# Patient Record
Sex: FEMALE | Race: BLACK OR AFRICAN AMERICAN | Employment: FULL TIME | URBAN - METROPOLITAN AREA
[De-identification: names, ages, dates, MRNs, and addresses within clinical notes are randomized per-mention and may not be internally consistent; named-entity substitution may affect disease eponyms.]

---

## 2017-03-17 ENCOUNTER — OFFICE VISIT (OUTPATIENT)
Dept: INTERNAL MEDICINE CLINIC | Age: 42
End: 2017-03-17

## 2017-03-17 VITALS
HEART RATE: 93 BPM | SYSTOLIC BLOOD PRESSURE: 124 MMHG | HEIGHT: 66 IN | BODY MASS INDEX: 29.73 KG/M2 | DIASTOLIC BLOOD PRESSURE: 78 MMHG | WEIGHT: 185 LBS | TEMPERATURE: 98.1 F | RESPIRATION RATE: 16 BRPM | OXYGEN SATURATION: 99 %

## 2017-03-17 DIAGNOSIS — N39.0 FREQUENT UTI: Primary | ICD-10-CM

## 2017-03-17 DIAGNOSIS — F41.8 SITUATIONAL ANXIETY: ICD-10-CM

## 2017-03-17 DIAGNOSIS — J30.89 NON-SEASONAL ALLERGIC RHINITIS DUE TO OTHER ALLERGIC TRIGGER: ICD-10-CM

## 2017-03-17 DIAGNOSIS — Z00.00 ROUTINE GENERAL MEDICAL EXAMINATION AT A HEALTH CARE FACILITY: ICD-10-CM

## 2017-03-17 DIAGNOSIS — H66.91 RECURRENT OTITIS MEDIA, RIGHT: ICD-10-CM

## 2017-03-17 DIAGNOSIS — Z98.84 S/P LAPAROSCOPIC SLEEVE GASTRECTOMY: ICD-10-CM

## 2017-03-17 LAB
BILIRUB UR QL STRIP: NEGATIVE
GLUCOSE UR-MCNC: NEGATIVE MG/DL
KETONES P FAST UR STRIP-MCNC: NEGATIVE MG/DL
PH UR STRIP: 6 [PH] (ref 4.6–8)
PROT UR QL STRIP: NORMAL MG/DL
SP GR UR STRIP: 1.03 (ref 1–1.03)
UA UROBILINOGEN AMB POC: NORMAL (ref 0.2–1)
URINALYSIS CLARITY POC: CLEAR
URINALYSIS COLOR POC: YELLOW
URINE BLOOD POC: NORMAL
URINE LEUKOCYTES POC: NEGATIVE
URINE NITRITES POC: NEGATIVE

## 2017-03-17 RX ORDER — CIPROFLOXACIN 250 MG/1
250 TABLET, FILM COATED ORAL 2 TIMES DAILY
Qty: 6 TAB | Refills: 0 | Status: SHIPPED | OUTPATIENT
Start: 2017-03-17 | End: 2017-03-20

## 2017-03-17 RX ORDER — PROPRANOLOL HYDROCHLORIDE 10 MG/1
10 TABLET ORAL
Qty: 30 TAB | Refills: 1 | Status: SHIPPED | OUTPATIENT
Start: 2017-03-17 | End: 2018-01-08

## 2017-03-17 RX ORDER — CETIRIZINE HYDROCHLORIDE 10 MG/1
10 TABLET, CHEWABLE ORAL DAILY
Qty: 30 TAB | Refills: 3 | Status: SHIPPED | OUTPATIENT
Start: 2017-03-17 | End: 2018-01-08

## 2017-03-17 RX ORDER — FLUTICASONE PROPIONATE 50 MCG
2 SPRAY, SUSPENSION (ML) NASAL DAILY
Qty: 1 BOTTLE | Refills: 3 | Status: SHIPPED | OUTPATIENT
Start: 2017-03-17

## 2017-03-17 NOTE — PROGRESS NOTES
HISTORY OF PRESENT ILLNESS  Lori Tavarez is a 39 y.o. female. Bladder Infection    The current episode started more than 1 week ago. The problem has been gradually worsening. The patient is experiencing no pain. There has been no fever. Associated symptoms include frequency (started 1 week ). Pertinent negatives include no chills, no hematuria, no hesitancy and no urgency. Her past medical history is significant for recurrent UTIs (followed by Dr. Caro Cole ). Cold Symptoms   The current episode started more than 1 week ago (3 weeks ). The cough is non-productive. Associated symptoms include rhinorrhea (clear ). Pertinent negatives include no chest pain, no chills, no ear congestion, no ear pain (repeat ear infections- seen at pt first ), no sore throat, no myalgias and no shortness of breath. Treatments tried: Zyrtec      Test Anxiety  She has a big administrative test coming- mid April. She gets anxiety and Tremors. Obesity  Body mass index is 29.86 kg/(m^2). Has been   SHx: career teach teacher for 11 years. Review of Systems   Constitutional: Positive for weight gain. Negative for chills. HENT: Positive for rhinorrhea (clear ). Negative for ear pain (repeat ear infections- seen at pt first ) and sore throat. Respiratory: Negative for shortness of breath. Cardiovascular: Negative for chest pain. Genitourinary: Positive for frequency (started 1 week ). Negative for hematuria, hesitancy and urgency. Musculoskeletal: Negative for myalgias. 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  30 Tab 3    ciprofloxacin HCl (CIPRO) 250 mg tablet Take 1 Tab by mouth two (2) times a day for 3 days. 6 Tab 0    fluticasone (FLONASE) 50 mcg/actuation nasal spray 2 Sprays by Both Nostrils route daily. 1 Bottle 3    propranolol (INDERAL) 10 mg tablet Take 1 Tab by mouth three (3) times daily as needed. 30mins-1hr before stress situation 30 Tab 1    ethinyl estradiol-etonogestrel (NUVARING) 0.12-0.015 mg/24 hr vaginal ring by Intravaginally route.  naltrexone-buPROPion (CONTRAVE) 8-90 mg TbER ER tablet First Month: Contrave 8mg/90mg #70    Week 1 : 1 Tab AM  Week 2 : 1 Tab AM, 1 Tab PM  Week 3 : 2 Tab AM, 1 Tab PM  Week 4 : 2 Tab AM, 2 Tab PM    Week 5 and Beyond: Contrave 8mg/90mg #120   2 Tab AM, 2 Tab PM 70 Tab 0    ergocalciferol (ERGOCALCIFEROL) 50,000 unit capsule Take 1 Cap by mouth every seven (7) days. 8 Cap 0    pantoprazole (PROTONIX) 40 mg tablet       famotidine (PEPCID) 20 mg tablet          No Known Allergies   Visit Vitals    /78 (BP 1 Location: Right arm, BP Patient Position: Sitting)    Pulse 93    Temp 98.1 °F (36.7 °C) (Oral)    Resp 16    Ht 5' 6\" (1.676 m)    Wt 185 lb (83.9 kg)    SpO2 99%    BMI 29.86 kg/m2       Physical Exam   Constitutional: She is oriented to person, place, and time. She appears well-developed. No distress. HENT:   Right Ear: Tympanic membrane is retracted. Left Ear: Tympanic membrane is retracted. Nose: Mucosal edema, rhinorrhea and septal deviation (slight to left ) present. Right sinus exhibits no maxillary sinus tenderness and no frontal sinus tenderness. Left sinus exhibits no maxillary sinus tenderness and no frontal sinus tenderness. Mouth/Throat: No posterior oropharyngeal edema or posterior oropharyngeal erythema. Eyes: Conjunctivae are normal.   Cardiovascular: Normal rate, regular rhythm and normal heart sounds. Pulmonary/Chest: Effort normal and breath sounds normal. No respiratory distress. She has no wheezes. She has no rales. She exhibits no tenderness. Lymphadenopathy:     She has cervical adenopathy. Neurological: She is alert and oriented to person, place, and time. Skin: Skin is warm. Psychiatric: She has a normal mood and affect. Recent Results (from the past 12 hour(s))   AMB POC URINALYSIS DIP STICK AUTO W/O MICRO    Collection Time: 03/17/17  1:40 PM   Result Value Ref Range    Color (UA POC) Yellow     Clarity (UA POC) Clear     Glucose (UA POC) Negative Negative    Bilirubin (UA POC) Negative Negative    Ketones (UA POC) Negative Negative    Specific gravity (UA POC) 1.030 1.001 - 1.035    Blood (UA POC) 2+ Negative    pH (UA POC) 6.0 4.6 - 8.0    Protein (UA POC) Trace Negative mg/dL    Urobilinogen (UA POC) 0.2 mg/dL 0.2 - 1    Nitrites (UA POC) Negative Negative    Leukocyte esterase (UA POC) Negative Negative       ASSESSMENT and PLAN  Mike was seen today for weight management. Diagnoses and all orders for this visit:    Frequent UTI- Udip negative but given her history will start empiric treatment will awaiting her lab UA/ cx   Red flags to warrant ER or earlier clinical evaluation reviewed. See AVS for full details of plan and patient discussion.     -     ciprofloxacin HCl (CIPRO) 250 mg tablet; Take 1 Tab by mouth two (2) times a day for 3 days.  -     AMB POC URINALYSIS DIP STICK AUTO W/O MICRO  -     UA/M W/RFLX CULTURE, COMP    Non-seasonal allergic rhinitis due to other allergic trigger- Allergic Rhinitis   -Use Flonase 1-2 sprays per nostril once a day  -Use nasal saline spray 3-4 times/day BEFORE Flonase  -Start taking a non sedating antihistamine such as Claritin, Allegra or Zyrtec (generic is fine)    -     cetirizine (ALL DAY ALLERGY) 10 mg chewable tablet; Take 1 Tab by mouth daily. -     fluticasone (FLONASE) 50 mcg/actuation nasal spray; 2 Sprays by Both Nostrils route daily.     Recurrent otitis media, right  -     REFERRAL TO ENT-OTOLARYNGOLOGY    Routine general medical examination at a health care facility  - LIPID PANEL  -     HEMOGLOBIN A1C WITH EAG    S/P laparoscopic sleeve gastrectomy  -     VITAMIN B12  -     CBC WITH AUTOMATED DIFF  -     IRON PROFILE  -     VITAMIN D, 25 HYDROXY    Situational anxiety- trial e  -     propranolol (INDERAL) 10 mg tablet; Take 1 Tab by mouth three (3) times daily as needed. 30mins-1hr before stress situation      Follow-up Disposition:  Return in about 5 months (around 8/17/2017) for Physical - 30 minute appointment. Medication risks/benefits/costs/interactions/alternatives discussed with patient. Leopoldo Hammersmith  was given an after visit summary which includes diagnoses, current medications, & vitals. she expressed understanding with the diagnosis and plan.

## 2017-03-17 NOTE — MR AVS SNAPSHOT
Visit Information Date & Time Provider Department Dept. Phone Encounter #  
 3/17/2017  1:15 PM Rose Coronel MD [address] [address] Internal Medicine [phone] Follow-up Instructions Return in about 5 months (around 8/17/2017) for Physical - 30 minute appointment. Upcoming Health Maintenance Date Due DTaP/Tdap/Td series (1 - Tdap) 10/28/1996 INFLUENZA AGE 9 TO ADULT 8/1/2016 PAP AKA CERVICAL CYTOLOGY 3/11/2018 Allergies as of 3/17/2017  Review Complete On: 3/17/2017 By: Roes Coronel MD  
 No Known Allergies Current Immunizations  Never Reviewed No immunizations on file. Not reviewed this visit You Were Diagnosed With   
  
 Codes Comments Frequent UTI    -  Primary ICD-10-CM: N39.0 ICD-9-CM: 599.0 Non-seasonal allergic rhinitis due to other allergic trigger     ICD-10-CM: J30.89 Recurrent otitis media, right     ICD-10-CM: H66.91 
ICD-9-CM: 382.9 Routine general medical examination at a health care facility     ICD-10-CM: Z00.00 ICD-9-CM: V70.0 S/P laparoscopic sleeve gastrectomy     ICD-10-CM: X89.65 ICD-9-CM: V45.86 Situational anxiety     ICD-10-CM: F41.8 ICD-9-CM: 300.09 Vitals BP Pulse Temp Resp Height(growth percentile) Weight(growth percentile) 124/78 (BP 1 Location: Right arm, BP Patient Position: Sitting) 93 98.1 °F (36.7 °C) (Oral) 16 5' 6\" (1.676 m) 185 lb (83.9 kg) LMP SpO2 BMI OB Status Smoking Status 02/22/2017 99% 29.86 kg/m2 Having regular periods Never Smoker Vitals History BMI and BSA Data Body Mass Index Body Surface Area  
 29.86 kg/m 2 1.98 m 2 Preferred Pharmacy Pharmacy Name Phone [facility] [address] [facility] [place] [phone] Your Updated Medication List  
  
   
This list is accurate as of: 3/17/17  2:06 PM.  Always use your most recent med list.  
  
  
  
  
 cetirizine 10 mg chewable tablet Commonly known as: All Day Allergy Take 1 Tab by mouth daily. ciprofloxacin HCl 250 mg tablet Commonly known as:  CIPRO Take 1 Tab by mouth two (2) times a day for 3 days. ergocalciferol 50,000 unit capsule Commonly known as:  ERGOCALCIFEROL Take 1 Cap by mouth every seven (7) days. ethinyl estradiol-etonogestrel 0.12-0.015 mg/24 hr vaginal ring Commonly known as:  NUVARING  
by Intravaginally route. famotidine 20 mg tablet Commonly known as:  PEPCID  
  
 fluticasone 50 mcg/actuation nasal spray Commonly known as:  Arlen Jumper 2 Sprays by Both Nostrils route daily. naltrexone-buPROPion 8-90 mg Tber ER tablet Commonly known as:  Jolene Amos First Month: Contrave 8mg/90mg #70  Week 1 : 1 Tab AM Week 2 : 1 Tab AM, 1 Tab PM Week 3 : 2 Tab AM, 1 Tab PM Week 4 : 2 Tab AM, 2 Tab PM  Week 5 and Beyond: Contrave 8mg/90mg #120  2 Tab AM, 2 Tab PM  
  
 pantoprazole 40 mg tablet Commonly known as:  PROTONIX  
  
 propranolol 10 mg tablet Commonly known as:  INDERAL Take 1 Tab by mouth three (3) times daily as needed. 30mins-1hr before stress situation Prescriptions Sent to Pharmacy Refills  
 cetirizine (ALL DAY ALLERGY) 10 mg chewable tablet 3 Sig: Take 1 Tab by mouth daily. Class: Normal  
 Pharmacy: [address] [place] [address] Ph #: [phone] Route: Oral  
 ciprofloxacin HCl (CIPRO) 250 mg tablet 0 Sig: Take 1 Tab by mouth two (2) times a day for 3 days. Class: Normal  
 Pharmacy: [address] [place] [address] Ph #: [phone] Route: Oral  
 fluticasone (FLONASE) 50 mcg/actuation nasal spray 3 Si Sprays by Both Nostrils route daily. Class: Normal  
 Pharmacy: [address] [place] [address] Ph #: [phone] Route:  Both Nostrils  
 propranolol (INDERAL) 10 mg tablet 1  
 Sig: Take 1 Tab by mouth three (3) times daily as needed. 30mins-1hr before stress situation Class: Normal  
 Pharmacy: [facility], [address] [place] [address] #: [phone] Route: Oral  
  
We Performed the Following AMB POC URINALYSIS DIP STICK AUTO W/O MICRO [20326 CPT(R)] CBC WITH AUTOMATED DIFF [16774 CPT(R)] HEMOGLOBIN A1C WITH EAG [99902 CPT(R)] IRON PROFILE [id number] CPT(R)] LIPID PANEL [74205 CPT(R)] REFERRAL TO ENT-OTOLARYNGOLOGY [MKY89 Custom] Comments:  
 Please evaluate patient for  Recurrent ear infection UA/M W/RFLX CULTURE, COMP [39466 CPT(R)] VITAMIN B12 W262109 CPT(R)] VITAMIN D, 25 HYDROXY [id number] CPT(R)] Follow-up Instructions Return in about 5 months (around 8/17/2017) for Physical - 30 minute appointment. Referral Information Referral ID Referred By Referred To  
  
 [id number] PAUL[facility], [place] Eliseo Goodwin MD   
   [address] [place] [address] Phone: [phone] Fax: [phone] Visits Status Start Date End Date 1 New Request 3/17/17 3/17/18 If your referral has a status of pending review or denied, additional information will be sent to support the outcome of this decision. Patient Instructions It was a pleasure to see you! As discussed: 
 
Congratulations on your weight loss and positive lifestyle changes!!! 
 
I have ordered your age appropriate labs please complete them. You will need to fast 10-12hrs before your lab appointment. Complete labs two weeks before your next appointment. Allergic Rhinitis  
-Use Flonase 1-2 sprays per nostril once a day 
-Use nasal saline spray 3-4 times/day BEFORE Flonase 
-Start taking a non sedating antihistamine such as Claritin, Allegra or Zyrtec (generic is fine) You are having UTI symptoms Take the antibiotics as prescribed. I've sent  A culture of the urine to find out what bacteria is causing your symptoms and if the current antibiotics will be effective. If we need to change your medication,our office will call you. Managing Your Allergies: Care Instructions Your Care Instructions Managing your allergies is an important part of staying healthy. Your doctor will help you find out what may be causing the allergies. Common causes of allergy symptoms are house dust and dust mites, animal dander, mold, and pollen. As soon as you know what triggers your symptoms, try to reduce your exposure to your triggers. This can help prevent allergy symptoms, asthma, and other health problems. Ask your doctor about allergy medicine or immunotherapy. These treatments may help reduce or prevent allergy symptoms. Follow-up care is a key part of your treatment and safety. Be sure to make and go to all appointments, and call your doctor if you are having problems. It's also a good idea to know your test results and keep a list of the medicines you take. How can you care for yourself at home? · If you think that dust or dust mites are causing your allergies: 
¨ Wash sheets, pillowcases, and other bedding every week in hot water. ¨ Use airtight, dust-proof covers for pillows, duvets, and mattresses. Avoid plastic covers, because they tend to tear quickly and do not \"breathe. \" Wash according to the instructions. ¨ Remove extra blankets and pillows that you don't need. ¨ Use blankets that are machine-washable. ¨ Don't use home humidifiers. They can help mites live longer. · Use air-conditioning. Change or clean all filters every month. Keep windows closed. Use high-efficiency air filters. Don't use window or attic fans, which draw dust into the air. · If you're allergic to pet dander, keep pets outside or, at the very least, out of your bedroom.  Old carpet and cloth-covered furniture can hold a lot of animal dander. You may need to replace them. · Look for signs of cockroaches. Use cockroach baits to get rid of them. Then clean your home well. · If you're allergic to mold, don't keep indoor plants, because molds can grow in soil. Get rid of furniture, rugs, and drapes that smell musty. Check for mold in the bathroom. · If you're allergic to pollen, stay inside when pollen counts are high. · Don't smoke or let anyone else smoke in your house. Don't use fireplaces or wood-burning stoves. Avoid paint fumes, perfumes, and other strong odors. When should you call for help? Give an epinephrine shot if: 
· You think you are having a severe allergic reaction. · You have symptoms in more than one body area, such as mild nausea and an itchy mouth. After giving an epinephrine shot call 911, even if you feel better. Call 911 if: 
· You have symptoms of a severe allergic reaction. These may include: 
¨ Sudden raised, red areas (hives) all over your body. ¨ Swelling of the throat, mouth, lips, or tongue. ¨ Trouble breathing. ¨ Passing out (losing consciousness). Or you may feel very lightheaded or suddenly feel weak, confused, or restless. · You have been given an epinephrine shot, even if you feel better. Call your doctor now or seek immediate medical care if: 
· You have symptoms of an allergic reaction, such as: ¨ A rash or hives (raised, red areas on the skin). ¨ Itching. ¨ Swelling. ¨ Belly pain, nausea, or vomiting. Watch closely for changes in your health, and be sure to contact your doctor if: 
· Your allergies get worse. · You need help controlling your allergies. · You have questions about allergy testing. · You do not get better as expected. Where can you learn more? Go to http://sumit-velia.info/. Enter L249 in the search box to learn more about \"Managing Your Allergies: Care Instructions. \" Current as of: February 12, 2016 Content Version: 11.1 © 7484-5097 Healthwise, Incorporated. Care instructions adapted under license by [employer] (which disclaims liability or warranty for this information). If you have questions about a medical condition or this instruction, always ask your healthcare professional. [address] any warranty or liability for your use of this information. Introducing [facility]! Jessica Delgado introduces goBramble patient portal. Now you can access parts of your medical record, email your doctor's office, and request medication refills online. 1. In your internet browser, go to [email or web address] 2. Click on the First Time User? Click Here link in the Sign In box. You will see the New Member Sign Up page. 3. Enter your goBramble Access Code exactly as it appears below. You will not need to use this code after youve completed the sign-up process. If you do not sign up before the expiration date, you must request a new code. · goBramble Access Code: [email or web address] Expires: 6/15/2017  2:04 PM 
 
4. Enter the last four digits of your Social Security Number (xxxx) and Date of Birth (mm/dd/yyyy) as indicated and click Submit. You will be taken to the next sign-up page. 5. Create a goBramble ID. This will be your goBramble login ID and cannot be changed, so think of one that is secure and easy to remember. 6. Create a goBramble password. You can change your password at any time. 7. Enter your Password Reset Question and Answer. This can be used at a later time if you forget your password. 8. Enter your e-mail address. You will receive e-mail notification when new information is available in [address] 9. Click Sign Up. You can now view and download portions of your medical record. 10. Click the Download Summary menu link to download a portable copy of your medical information.  
 
If you have questions, please visit the Frequently Asked Questions section of the [email or web address]. Remember, OutSystemshart is NOT to be used for urgent needs. For medical emergencies, dial 911. Now available from your iPhone and Android! Please provide this summary of care documentation to your next provider. Your primary care clinician is listed as Merry Alert. If you have any questions after today's visit, please call [phone].

## 2017-03-17 NOTE — PATIENT INSTRUCTIONS
It was a pleasure to see you! As discussed:    Congratulations on your weight loss and positive lifestyle changes!!!    I have ordered your age appropriate labs please complete them. You will need to fast 10-12hrs before your lab appointment. Complete labs two weeks before your next appointment. Allergic Rhinitis   -Use Flonase 1-2 sprays per nostril once a day  -Use nasal saline spray 3-4 times/day BEFORE Flonase  -Start taking a non sedating antihistamine such as Claritin, Allegra or Zyrtec (generic is fine)    You are having UTI symptoms   Take the antibiotics as prescribed. I've sent  A culture of the urine to find out what bacteria is causing your symptoms and if the current antibiotics will be effective. If we need to change your medication,our office will call you. Managing Your Allergies: Care Instructions  Your Care Instructions  Managing your allergies is an important part of staying healthy. Your doctor will help you find out what may be causing the allergies. Common causes of allergy symptoms are house dust and dust mites, animal dander, mold, and pollen. As soon as you know what triggers your symptoms, try to reduce your exposure to your triggers. This can help prevent allergy symptoms, asthma, and other health problems. Ask your doctor about allergy medicine or immunotherapy. These treatments may help reduce or prevent allergy symptoms. Follow-up care is a key part of your treatment and safety. Be sure to make and go to all appointments, and call your doctor if you are having problems. It's also a good idea to know your test results and keep a list of the medicines you take. How can you care for yourself at home? · If you think that dust or dust mites are causing your allergies:  ¨ Wash sheets, pillowcases, and other bedding every week in hot water. ¨ Use airtight, dust-proof covers for pillows, duvets, and mattresses.  Avoid plastic covers, because they tend to tear quickly and do not \"breathe. \" Wash according to the instructions. ¨ Remove extra blankets and pillows that you don't need. ¨ Use blankets that are machine-washable. ¨ Don't use home humidifiers. They can help mites live longer. · Use air-conditioning. Change or clean all filters every month. Keep windows closed. Use high-efficiency air filters. Don't use window or attic fans, which draw dust into the air. · If you're allergic to pet dander, keep pets outside or, at the very least, out of your bedroom. Old carpet and cloth-covered furniture can hold a lot of animal dander. You may need to replace them. · Look for signs of cockroaches. Use cockroach baits to get rid of them. Then clean your home well. · If you're allergic to mold, don't keep indoor plants, because molds can grow in soil. Get rid of furniture, rugs, and drapes that smell musty. Check for mold in the bathroom. · If you're allergic to pollen, stay inside when pollen counts are high. · Don't smoke or let anyone else smoke in your house. Don't use fireplaces or wood-burning stoves. Avoid paint fumes, perfumes, and other strong odors. When should you call for help? Give an epinephrine shot if:  · You think you are having a severe allergic reaction. · You have symptoms in more than one body area, such as mild nausea and an itchy mouth. After giving an epinephrine shot call 911, even if you feel better. Call 911 if:  · You have symptoms of a severe allergic reaction. These may include:  ¨ Sudden raised, red areas (hives) all over your body. ¨ Swelling of the throat, mouth, lips, or tongue. ¨ Trouble breathing. ¨ Passing out (losing consciousness). Or you may feel very lightheaded or suddenly feel weak, confused, or restless. · You have been given an epinephrine shot, even if you feel better.   Call your doctor now or seek immediate medical care if:  · You have symptoms of an allergic reaction, such as:  ¨ A rash or hives (raised, red areas on the skin). ¨ Itching. ¨ Swelling. ¨ Belly pain, nausea, or vomiting. Watch closely for changes in your health, and be sure to contact your doctor if:  · Your allergies get worse. · You need help controlling your allergies. · You have questions about allergy testing. · You do not get better as expected. Where can you learn more? Go to http://sumit-velia.info/. Enter L249 in the search box to learn more about \"Managing Your Allergies: Care Instructions. \"  Current as of: February 12, 2016  Content Version: 11.1  © 3805-8703 [employer]. Care instructions adapted under license by [employer] (which disclaims liability or warranty for this information). If you have questions about a medical condition or this instruction, always ask your healthcare professional. [address] any warranty or liability for your use of this information.

## 2017-03-24 LAB
APPEARANCE UR: CLEAR
BACTERIA #/AREA URNS HPF: NORMAL /[HPF]
BILIRUB UR QL STRIP: NEGATIVE
CASTS URNS QL MICRO: NORMAL /LPF
COLOR UR: YELLOW
EPI CELLS #/AREA URNS HPF: NORMAL /HPF
GLUCOSE UR QL: NEGATIVE
HGB UR QL STRIP: ABNORMAL
KETONES UR QL STRIP: NEGATIVE
LEUKOCYTE ESTERASE UR QL STRIP: NEGATIVE
MICRO URNS: ABNORMAL
MUCOUS THREADS URNS QL MICRO: PRESENT
NITRITE UR QL STRIP: NEGATIVE
PH UR STRIP: 6 [PH] (ref 5–7.5)
PROT UR QL STRIP: NEGATIVE
RBC #/AREA URNS HPF: NORMAL /HPF
SP GR UR: 1.02 (ref 1–1.03)
URINALYSIS REFLEX , 377201: ABNORMAL
UROBILINOGEN UR STRIP-MCNC: 0.2 MG/DL (ref 0.2–1)
WBC #/AREA URNS HPF: NORMAL /HPF

## 2017-03-27 NOTE — PROGRESS NOTES
Please call Chastity Smith and see if her symptoms have improved. Her final urinalysis showed blood but no infection.  If she is still having symptoms I recommend she return to the Urologist.

## 2017-03-29 ENCOUNTER — TELEPHONE (OUTPATIENT)
Dept: INTERNAL MEDICINE CLINIC | Age: 42
End: 2017-03-29

## 2017-03-29 NOTE — TELEPHONE ENCOUNTER
----- Message from James Collins MD sent at 3/27/2017  8:48 AM EDT -----  Please call Pavel Navarrete and see if her symptoms have improved. Her final urinalysis showed blood but no infection.  If she is still having symptoms I recommend she return to the Urologist.

## 2017-03-30 ENCOUNTER — TELEPHONE (OUTPATIENT)
Dept: INTERNAL MEDICINE CLINIC | Age: 42
End: 2017-03-30

## 2017-03-30 NOTE — TELEPHONE ENCOUNTER
Pt has been informed per drs result notes and recs, she would like the referral to urology and something for anxiety that may would work a little better.

## 2017-03-30 NOTE — TELEPHONE ENCOUNTER
----- Message from Bertrand David MD sent at 3/27/2017  8:48 AM EDT -----  Please call Therese Benavidez and see if her symptoms have improved. Her final urinalysis showed blood but no infection.  If she is still having symptoms I recommend she return to the Urologist.

## 2017-04-03 DIAGNOSIS — R31.9 HEMATURIA: Primary | ICD-10-CM

## 2017-04-03 RX ORDER — BUSPIRONE HYDROCHLORIDE 15 MG/1
15 TABLET ORAL
Qty: 30 TAB | Refills: 1 | Status: SHIPPED | OUTPATIENT
Start: 2017-04-03 | End: 2021-03-31

## 2018-01-08 ENCOUNTER — OFFICE VISIT (OUTPATIENT)
Dept: INTERNAL MEDICINE CLINIC | Age: 43
End: 2018-01-08

## 2018-01-08 ENCOUNTER — TELEPHONE (OUTPATIENT)
Dept: INTERNAL MEDICINE CLINIC | Age: 43
End: 2018-01-08

## 2018-01-08 ENCOUNTER — DOCUMENTATION ONLY (OUTPATIENT)
Dept: INTERNAL MEDICINE CLINIC | Age: 43
End: 2018-01-08

## 2018-01-08 VITALS
SYSTOLIC BLOOD PRESSURE: 124 MMHG | BODY MASS INDEX: 31.63 KG/M2 | DIASTOLIC BLOOD PRESSURE: 90 MMHG | WEIGHT: 196.8 LBS | RESPIRATION RATE: 16 BRPM | TEMPERATURE: 98.2 F | HEIGHT: 66 IN

## 2018-01-08 DIAGNOSIS — Z98.84 S/P LAPAROSCOPIC SLEEVE GASTRECTOMY: ICD-10-CM
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
## 2018-01-08 DIAGNOSIS — M54.31 SCIATICA OF RIGHT SIDE: ICD-10-CM

## 2018-01-08 DIAGNOSIS — M25.511 ACUTE PAIN OF RIGHT SHOULDER: ICD-10-CM

## 2018-01-08 DIAGNOSIS — Z00.00 ROUTINE GENERAL MEDICAL EXAMINATION AT A HEALTH CARE FACILITY: ICD-10-CM

## 2018-01-08 DIAGNOSIS — R03.0 ELEVATED BLOOD PRESSURE READING: Primary | ICD-10-CM

## 2018-01-08 DIAGNOSIS — J30.89 ACUTE NONSEASONAL ALLERGIC RHINITIS DUE TO OTHER ALLERGEN: ICD-10-CM

## 2018-01-08 PROBLEM — F33.9 RECURRENT DEPRESSION (HCC): Status: ACTIVE | Noted: 2018-01-08

## 2018-01-08 RX ORDER — TRAMADOL HYDROCHLORIDE 50 MG/1
50 TABLET ORAL
Qty: 20 TAB | Refills: 0 | Status: SHIPPED | OUTPATIENT
Start: 2018-01-08 | End: 2021-03-31

## 2018-01-08 RX ORDER — METHYLPREDNISOLONE 4 MG/1
TABLET ORAL
Qty: 1 DOSE PACK | Refills: 0 | Status: SHIPPED | OUTPATIENT
Start: 2018-01-08 | End: 2021-03-31

## 2018-01-08 RX ORDER — CETIRIZINE HYDROCHLORIDE 10 MG/1
10 TABLET, CHEWABLE ORAL DAILY
Qty: 30 TAB | Refills: 3 | Status: SHIPPED | OUTPATIENT
Start: 2018-01-08 | End: 2018-07-03 | Stop reason: SDUPTHER

## 2018-01-08 NOTE — PROGRESS NOTES
HISTORY OF PRESENT ILLNESS  Deondre Hanson is a 43 y.o. female. Fall   Incident onset: 12/31/17  The fall occurred while standing (slipped in elevator ). She landed on concrete. Point of impact: right side  The pain is present in the right shoulder. The pain is at a severity of 7/10. The pain is moderate. She was ambulatory at the scene. There was no entrapment after the fall. There was no drug use involved in the accident. There was no alcohol use involved in the accident. Associated symptoms include tingling (in leg ). Pertinent negatives include no fever, no numbness, no nausea and no vomiting. The symptoms are aggravated by activity and pressure on injury. She has tried NSAIDs (went to ER in [place] ) for the symptoms. Review of Systems   Constitutional: Negative for fever. Gastrointestinal: Negative for nausea and vomiting. Neurological: Positive for tingling (in leg ). Negative for numbness. 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 30 Tab 3    methylPREDNISolone (MEDROL DOSEPACK) 4 mg tablet Take as directed 1 Dose Pack 0    traMADol (ULTRAM) 50 mg tablet Take 1 Tab by mouth every eight (8) hours as needed for Pain. Max Daily Amount: 150 mg. 20 Tab 0    busPIRone (BUSPAR) 15 mg tablet Take 1 Tab by mouth three (3) times daily as needed. 30 Tab 1    fluticasone (FLONASE) 50 mcg/actuation nasal spray 2 Sprays by Both Nostrils route daily. 1 Bottle 3    ethinyl estradiol-etonogestrel (NUVARING) 0.12-0.015 mg/24 hr vaginal ring by Intravaginally route.       pantoprazole (PROTONIX) 40 mg tablet       famotidine (PEPCID) 20 mg tablet          No Known Allergies     Visit Vitals    /90 (BP 1 Location: Left arm, BP Patient Position: Sitting)    Temp 98.2 °F (36.8 °C) (Oral)    Resp 16    Ht 5' 6\" (1.676 m)    Wt 196 lb 12.8 oz (89.3 kg)    BMI 31.76 kg/m2         Physical Exam   Constitutional: She is oriented to person, place, and time. She appears well-developed. No distress. Eyes: Conjunctivae are normal.   Neck: Neck supple. Cardiovascular: Normal rate, regular rhythm and normal heart sounds. Pulmonary/Chest: Effort normal and breath sounds normal. No respiratory distress. She has no wheezes. She has no rales. She exhibits no tenderness. Musculoskeletal:        Right shoulder: She exhibits tenderness, swelling and pain. She exhibits no effusion and no crepitus. Lumbar back: She exhibits tenderness. Arms:       Right upper leg: She exhibits tenderness and bony tenderness. Legs:  RUE in sling    Neurological: She is alert and oriented to person, place, and time. Skin: Skin is warm. Psychiatric: She has a normal mood and affect. ASSESSMENT and PLAN  Diagnoses and all orders for this visit:    1. Elevated blood pressure reading- recurrent- elevated in the context of acute pain. Check BP at home. Red flags to warrant ER or earlier clinical evaluation reviewed. See AVS for full details of plan and patient discussion. 2. Fall, subsequent encounter- mechanical fall. 3. Acute pain of right shoulder- poor candidate for NSAIDS give h/o sleeve gastrectomy. Would benefit from seeing Orthopedics. Medrol dose pack indicated given severity of s/s. [facility] Tr[facility] for severe sx. Red flags to warrant ER or earlier clinical evaluation reviewed. -     REFERRAL TO SPORTS MEDICINE  -     methylPREDNISolone (MEDROL DOSEPACK) 4 mg tablet; Take as directed  -     traMADol (ULTRAM) 50 mg tablet;  Take 1 Tab by mouth every eight (8) hours as needed for Pain. Max Daily Amount: 150 mg.    4. Sciatica of right side- see above   -     REFERRAL TO SPORTS MEDICINE  -     methylPREDNISolone (MEDROL DOSEPACK) 4 mg tablet; Take as directed  -     traMADol (ULTRAM) 50 mg tablet; Take 1 Tab by mouth every eight (8) hours as needed for Pain. Max Daily Amount: 150 mg.    5. S/P laparoscopic sleeve gastrectomy- overdue for interval bariatric surgery f/u  -     LIPID PANEL  -     VITAMIN B12 & FOLATE    6. Routine general medical examination at a health care facility  -     LIPID PANEL  -     CBC WITH AUTOMATED DIFF  -     IRON PROFILE  -     FERRITIN  -     VITAMIN B12 & FOLATE  -     METABOLIC PANEL, COMPREHENSIVE  -     MAGNESIUM    7. Acute nonseasonal allergic rhinitis due to other allergen  -     cetirizine (ALL DAY ALLERGY) 10 mg chewable tablet; Take 1 Tab by mouth daily. Follow-up Disposition:  Return in about 4 months (around 5/8/2018) for Physical - 30 minute appointment, Labs completed 2 weeks before appointment. Medication risks/benefits/costs/interactions/alternatives discussed with patient. Juan Daniel Kaufman  was given an after visit summary which includes diagnoses, current medications, & vitals. she expressed understanding with the diagnosis and plan.

## 2018-01-08 NOTE — TELEPHONE ENCOUNTER
[phone] pt says that she saw dr Mica Erickson this morning for a fu from a fall that she had and the the dr was suppose to give her a work note. She forgot to give her that note.

## 2018-01-08 NOTE — TELEPHONE ENCOUNTER
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 Planning on returning to work tomorrow. She has scheduled with orthopedic for next week, please advise.  Patient will  today , please inform when ready for

## 2018-01-08 NOTE — MR AVS SNAPSHOT
Visit Information Date & Time Provider Department Dept. Phone Encounter #  
 1/8/2018  9:45 AM Lois Mendez MD [facility] Internal Medicine [phone] [id number] Follow-up Instructions Return in about 4 months (around 5/8/2018) for Physical - 30 minute appointment, Labs completed 2 weeks before appointment. Upcoming Health Maintenance Date Due  
 PAP AKA CERVICAL CYTOLOGY 3/11/2018 DTaP/Tdap/Td series (2 - Td) 12/1/2022 Allergies as of 1/8/2018  Review Complete On: 1/8/2018 By: Lois Mendez MD  
 No Known Allergies Current Immunizations  Never Reviewed No immunizations on file. Not reviewed this visit You Were Diagnosed With   
  
 Codes Comments Elevated blood pressure reading    -  Primary ICD-10-CM: R03.0 ICD-9-CM: 796.2 Fall, subsequent encounter     ICD-10-CM: W19. Dorothea Trivedi ICD-9-CM: V58.89, E888.9 Acute pain of right shoulder     ICD-10-CM: M25.511 ICD-9-CM: 719.41 Sciatica of right side     ICD-10-CM: M54.31 
ICD-9-CM: 724.3 S/P laparoscopic sleeve gastrectomy     ICD-10-CM: F09.89 ICD-9-CM: V45.86 Routine general medical examination at a health care facility     ICD-10-CM: Z00.00 ICD-9-CM: V70.0 Acute nonseasonal allergic rhinitis due to other allergen     ICD-10-CM: J30.89 ICD-9-CM: 477.8 Vitals BP Temp Resp Height(growth percentile) Weight(growth percentile) LMP  
 124/90 (BP 1 Location: Left arm, BP Patient Position: Sitting) 98.2 °F (36.8 °C) (Oral) 16 5' 6\" (1.676 m) 196 lb 12.8 oz (89.3 kg) 12/25/2017 BMI OB Status Smoking Status 31.76 kg/m2 Having regular periods Never Smoker Vitals History BMI and BSA Data Body Mass Index Body Surface Area 31.76 kg/m 2 2.04 m 2 Preferred Pharmacy Pharmacy Name Phone [facility] [address] [facility] [place] [phone] Your Updated Medication List  
  
   
 This list is accurate as of: 1/8/18 10:28 AM.  Always use your most recent med list.  
  
  
  
  
 busPIRone 15 mg tablet Commonly known as:  BUSPAR Take 1 Tab by mouth three (3) times daily as needed. cetirizine 10 mg chewable tablet Commonly known as: All Day Allergy Take 1 Tab by mouth daily. ethinyl estradiol-etonogestrel 0.12-0.015 mg/24 hr vaginal ring Commonly known as:  NUVARING  
by Intravaginally route. famotidine 20 mg tablet Commonly known as:  PEPCID  
  
 fluticasone 50 mcg/actuation nasal spray Commonly known as:  Huy Remedies 2 Sprays by Both Nostrils route daily. methylPREDNISolone 4 mg tablet Commonly known as:  Marden Jamil Take as directed  
  
 pantoprazole 40 mg tablet Commonly known as:  PROTONIX  
  
 traMADol 50 mg tablet Commonly known as:  ULTRAM  
Take 1 Tab by mouth every eight (8) hours as needed for Pain. Max Daily Amount: 150 mg.  
  
  
  
  
Prescriptions Printed Refills  
 traMADol (ULTRAM) 50 mg tablet 0 Sig: Take 1 Tab by mouth every eight (8) hours as needed for Pain. Max Daily Amount: 150 mg.  
 Class: Print Route: Oral  
  
Prescriptions Sent to Pharmacy Refills  
 cetirizine (ALL DAY ALLERGY) 10 mg chewable tablet 3 Sig: Take 1 Tab by mouth daily. Class: Normal  
 Pharmacy: [address] [place] [address] Ph #: [phone] Route: Oral  
 methylPREDNISolone (MEDROL DOSEPACK) 4 mg tablet 0 Sig: Take as directed Class: Normal  
 Pharmacy: [address] [place] [address] Ph #: [phone] We Performed the Following CBC WITH AUTOMATED DIFF [52385 CPT(R)] FERRITIN [61905 CPT(R)] IRON PROFILE [id number] CPT(R)] LIPID PANEL [26676 CPT(R)] MAGNESIUM [id number] CPT(R)] METABOLIC PANEL, COMPREHENSIVE [21568 CPT(R)] REFERRAL TO SPORTS MEDICINE [IZE381 Custom] VITAMIN B12 & FOLATE [86176 CPT(R)] Follow-up Instructions Return in about 4 months (around 5/8/2018) for Physical - 30 minute appointment, Labs completed 2 weeks before appointment. Referral Information Referral ID Referred By Referred To  
  
 [id number] Tony KNOX AT [facility]   
   [address] [address] [address], [address] Visits Status Start Date End Date 1 New Request 1/8/18 1/8/19 If your referral has a status of pending review or denied, additional information will be sent to support the outcome of this decision. Patient Instructions It was a pleasure to see you! As discussed: 
 
I have ordered your age appropriate labs please complete them. You will need to fast 10-12hrs before your lab appointment. Complete labs two weeks before your next appointment. Schedule with orthopedics. In the interim take the medication prescribed. Musculoskeletal Pain: Care Instructions Your Care Instructions Different problems with the bones, muscles, nerves, ligaments, and tendons in the body can cause pain. One or more areas of your body may ache or burn. Or they may feel tired, stiff, or sore. The medical term for this type of pain is musculoskeletal pain. It can have many different causes. Sometimes the pain is caused by an injury such as a strain or sprain. Or you might have pain from using one part of your body in the same way over and over again. This is called overuse. In some cases, the cause of the pain is another health problem such as arthritis or fibromyalgia. The doctor will examine you and ask you questions about your health to help find the cause of your pain. Blood tests or imaging tests like an X-ray may also be helpful. But sometimes doctors can't find a cause of the pain. Treatment depends on your symptoms and the cause of the pain, if known. The doctor has checked you carefully, but problems can develop later.  If you notice any problems or new symptoms, get medical treatment right away. Follow-up care is a key part of your treatment and safety. Be sure to make and go to all appointments, and call your doctor if you are having problems. It's also a good idea to know your test results and keep a list of the medicines you take. How can you care for yourself at home? · Rest until you feel better. · Do not do anything that makes the pain worse. Return to exercise gradually if you feel better and your doctor says it's okay. · Be safe with medicines. Read and follow all instructions on the label. ¨ If the doctor gave you a prescription medicine for pain, take it as prescribed. ¨ If you are not taking a prescription pain medicine, ask your doctor if you can take an over-the-counter medicine. · Put ice or a cold pack on the area for 10 to 20 minutes at a time to ease pain. Put a thin cloth between the ice and your skin. When should you call for help? Call your doctor now or seek immediate medical care if: 
? · You have new pain, or your pain gets worse. ? · You have new symptoms such as a fever, a rash, or chills. ? Watch closely for changes in your health, and be sure to contact your doctor if: 
? · You do not get better as expected. Where can you learn more? Go to http://sumit-velia.info/. Enter B636 in the search box to learn more about \"Musculoskeletal Pain: Care Instructions. \" Current as of: October 14, 2016 Content Version: 11.4 © 0629-8362 Healthwise, Incorporated. Care instructions adapted under license by [employer] (which disclaims liability or warranty for this information). If you have questions about a medical condition or this instruction, always ask your healthcare professional. [place] [address] any warranty or liability for your use of this information. Introducing [facility]! Juan Ramon Craft introduces StubHub patient portal. Now you can access parts of your medical record, email your doctor's office, and request medication refills online. 1. In your internet browser, go to [email or web address] 2. Click on the First Time User? Click Here link in the Sign In box. You will see the New Member Sign Up page. 3. Enter your StubHub Access Code exactly as it appears below. You will not need to use this code after youve completed the sign-up process. If you do not sign up before the expiration date, you must request a new code. · StubHub Access Code: [email or web address] Expires: 4/8/2018 10:28 AM 
 
4. Enter the last four digits of your Social Security Number (xxxx) and Date of Birth (mm/dd/yyyy) as indicated and click Submit. You will be taken to the next sign-up page. 5. Create a StubHub ID. This will be your StubHub login ID and cannot be changed, so think of one that is secure and easy to remember. 6. Create a StubHub password. You can change your password at any time. 7. Enter your Password Reset Question and Answer. This can be used at a later time if you forget your password. 8. Enter your e-mail address. You will receive e-mail notification when new information is available in [address] 9. Click Sign Up. You can now view and download portions of your medical record. 10. Click the Download Summary menu link to download a portable copy of your medical information. If you have questions, please visit the Frequently Asked Questions section of the StubHub website. Remember, StubHub is NOT to be used for urgent needs. For medical emergencies, dial 911. Now available from your iPhone and Android! Please provide this summary of care documentation to your next provider. Your primary care clinician is listed as Ada Acre. If you have any questions after today's visit, please call [phone].

## 2018-01-08 NOTE — PROGRESS NOTES
Chief Complaint   Patient presents with    Fall     1. Have you been to the ER, urgent care clinic since your last visit? Hospitalized since your last visit? Yes [facility] Reason for visit: Fall    2. Have you seen or consulted any other health care providers outside of the [address] [place] [address] since your last visit? Include any pap smears or colon screening.  No

## 2018-01-08 NOTE — PATIENT INSTRUCTIONS
It was a pleasure to see you! As discussed:    I have ordered your age appropriate labs please complete them. You will need to fast 10-12hrs before your lab appointment. Complete labs two weeks before your next appointment. Schedule with orthopedics. In the interim take the medication prescribed. Musculoskeletal Pain: Care Instructions  Your Care Instructions  Different problems with the bones, muscles, nerves, ligaments, and tendons in the body can cause pain. One or more areas of your body may ache or burn. Or they may feel tired, stiff, or sore. The medical term for this type of pain is musculoskeletal pain. It can have many different causes. Sometimes the pain is caused by an injury such as a strain or sprain. Or you might have pain from using one part of your body in the same way over and over again. This is called overuse. In some cases, the cause of the pain is another health problem such as arthritis or fibromyalgia. The doctor will examine you and ask you questions about your health to help find the cause of your pain. Blood tests or imaging tests like an X-ray may also be helpful. But sometimes doctors can't find a cause of the pain. Treatment depends on your symptoms and the cause of the pain, if known. The doctor has checked you carefully, but problems can develop later. If you notice any problems or new symptoms, get medical treatment right away. Follow-up care is a key part of your treatment and safety. Be sure to make and go to all appointments, and call your doctor if you are having problems. It's also a good idea to know your test results and keep a list of the medicines you take. How can you care for yourself at home? · Rest until you feel better. · Do not do anything that makes the pain worse. Return to exercise gradually if you feel better and your doctor says it's okay. · Be safe with medicines. Read and follow all instructions on the label.   ¨ If the doctor gave you a prescription medicine for pain, take it as prescribed. ¨ If you are not taking a prescription pain medicine, ask your doctor if you can take an over-the-counter medicine. · Put ice or a cold pack on the area for 10 to 20 minutes at a time to ease pain. Put a thin cloth between the ice and your skin. When should you call for help? Call your doctor now or seek immediate medical care if:  ? · You have new pain, or your pain gets worse. ? · You have new symptoms such as a fever, a rash, or chills. ? Watch closely for changes in your health, and be sure to contact your doctor if:  ? · You do not get better as expected. Where can you learn more? Go to http://sumit-velia.info/. Enter B675 in the search box to learn more about \"Musculoskeletal Pain: Care Instructions. \"  Current as of: October 14, 2016  Content Version: 11.4  © 0221-6837 [employer]. Care instructions adapted under license by [employer] (which disclaims liability or warranty for this information). If you have questions about a medical condition or this instruction, always ask your healthcare professional. [place] [address] any warranty or liability for your use of this information.

## 2018-07-03 ENCOUNTER — TELEPHONE (OUTPATIENT)
Dept: INTERNAL MEDICINE CLINIC | Age: 43
End: 2018-07-03

## 2018-07-03 DIAGNOSIS — M25.511 ACUTE PAIN OF RIGHT SHOULDER: ICD-10-CM

## 2018-07-03 DIAGNOSIS — M54.31 SCIATICA OF RIGHT SIDE: ICD-10-CM

## 2018-07-03 RX ORDER — TRAMADOL HYDROCHLORIDE 50 MG/1
50 TABLET ORAL
Qty: 20 TAB | Refills: 0 | OUTPATIENT
Start: 2018-07-03

## 2018-07-03 RX ORDER — CETIRIZINE HYDROCHLORIDE 10 MG/1
10 TABLET, CHEWABLE ORAL DAILY
Qty: 30 TAB | Refills: 3 | Status: SHIPPED | OUTPATIENT
Start: 2018-07-03 | End: 2021-03-31

## 2018-07-03 NOTE — TELEPHONE ENCOUNTER
Patient had to cancel her appt with Dr. Tana Kocher this afternoon. It looked like it was 2 15minute slots put together for a cpe. I told pt I would need to ask about when we could reschedule that.   Patient requesting that her tramadol and zyrtec be refilled -- is that possible since she has not been seen since Jan?

## 2018-07-03 NOTE — TELEPHONE ENCOUNTER
Patient states she did not request tramadol and has done PT . She is requesting something for anxiety. Buspirone previously prescribed. Patient had recent labs done at [address] 3 weeks ago .  She will call back to schedule a CPE

## 2018-07-03 NOTE — TELEPHONE ENCOUNTER
Per message patient had to cancel her previous appt and would like to reschedule. Should she have first available or to be worked in sooner for a CPE. Last seen 01/18.  Patient requests tramadol and zyrtec

## 2018-07-03 NOTE — TELEPHONE ENCOUNTER
Remind her to complete labs. Tramadol was a short script for an acute problem. Cannot be refilled without evaluation. She was referred to sports medicine did she go?

## 2018-07-05 RX ORDER — BUSPIRONE HYDROCHLORIDE 15 MG/1
15 TABLET ORAL
Qty: 45 TAB | Refills: 0 | Status: SHIPPED | OUTPATIENT
Start: 2018-07-05 | End: 2021-03-31

## 2018-07-05 NOTE — TELEPHONE ENCOUNTER
Patient has been informed per drs orders for Buspirone 15 mg . Advised CPE appt needed for follow up .

## 2019-02-26 ENCOUNTER — OFFICE VISIT (OUTPATIENT)
Dept: INTERNAL MEDICINE CLINIC | Age: 44
End: 2019-02-26

## 2019-02-26 VITALS
SYSTOLIC BLOOD PRESSURE: 128 MMHG | DIASTOLIC BLOOD PRESSURE: 88 MMHG | TEMPERATURE: 97.9 F | HEART RATE: 77 BPM | RESPIRATION RATE: 16 BRPM | OXYGEN SATURATION: 97 % | BODY MASS INDEX: 31.47 KG/M2 | WEIGHT: 195.8 LBS | HEIGHT: 66 IN

## 2019-02-26 DIAGNOSIS — Z98.890 HISTORY OF GASTRIC SURGERY: ICD-10-CM

## 2019-02-26 DIAGNOSIS — Z86.39 HISTORY OF VITAMIN D DEFICIENCY: ICD-10-CM

## 2019-02-26 DIAGNOSIS — Z00.00 HEALTHCARE MAINTENANCE: Primary | ICD-10-CM

## 2019-02-26 NOTE — PROGRESS NOTES
Chief Complaint   Patient presents with    Complete Physical     Patient states she is here for her physical.

## 2019-02-26 NOTE — PROGRESS NOTES
HISTORY OF PRESENT ILLNESS  Ghanshyam Arroyo is a 37 y.o. female. HPI  Patient presents for a physical exam.She had acortisone shot in her right shoulder this morning with Dr. Nguyen Mota. She is status post gastric sleeve. She has a history of B12, and Vitamin D deficiency. BP also has been elevated in the past. She was last here in 1/18. Labs were ordered in 2017 and 2018 and were never drawn, but  patient has had labs with her surgeon from [facility]. These are obtained and reviewed. She was deficient in Vitamin D 6/18. She states that she was treated. Patient sees her Gyn and had a Pap earlier this month and has a prescription for her mammogram.    She denies problems with depression or anxiety. She no longer takes Buspar. She denies suicidal thoughts. She has been trying to eat well, and walks for exercise. Her weight has been stable since last year. She has a history of sleep apnea, but has not had a problem since her weight loss surgery. Past Medical History:   Diagnosis Date    Depression     Joint pain     and stiffness    Morbid obesity ([address]     Sleep apnea     No CPAP     Past Surgical History:   Procedure Laterality Date    HX CHOLECYSTECTOMY      HX OTHER SURGICAL      open heart surgery-hole in her heart-age 7      Current Outpatient Medications on File Prior to Visit   Medication Sig Dispense Refill    busPIRone (BUSPAR) 15 mg tablet Take 1 Tab by mouth three (3) times daily as needed. 45 Tab 0    cetirizine (ALL DAY ALLERGY) 10 mg chewable tablet Take 1 Tab by mouth daily. 30 Tab 3    busPIRone (BUSPAR) 15 mg tablet Take 1 Tab by mouth three (3) times daily as needed. 30 Tab 1    fluticasone (FLONASE) 50 mcg/actuation nasal spray 2 Sprays by Both Nostrils route daily. 1 Bottle 3    famotidine (PEPCID) 20 mg tablet       ethinyl estradiol-etonogestrel (NUVARING) 0.12-0.015 mg/24 hr vaginal ring by Intravaginally route.       methylPREDNISolone (MEDROL DOSEPACK) 4 mg tablet Take as directed 1 Dose Pack 0    traMADol (ULTRAM) 50 mg tablet Take 1 Tab by mouth every eight (8) hours as needed for Pain. Max Daily Amount: 150 mg. 20 Tab 0    pantoprazole (PROTONIX) 40 mg tablet        No current facility-administered medications on file prior to visit. NKDA    Review of Systems   Constitutional: Negative for fever and weight loss. HENT: Negative for congestion, ear pain and hearing loss. History of allergic rhinitis. Uses Cetrizine and Flonase as needed   Eyes: Negative for blurred vision and double vision. Respiratory: Negative for cough, shortness of breath and wheezing. Cardiovascular: Negative for chest pain, palpitations and leg swelling. Gastrointestinal: Negative for abdominal pain, constipation, diarrhea, heartburn, nausea and vomiting. Genitourinary: Negative for dysuria, frequency and urgency. Musculoskeletal:        Right shoulder pain   Neurological: Negative for headaches. Psychiatric/Behavioral: Negative for depression. The patient is not nervous/anxious. Physical Exam  Visit Vitals  /88 (BP 1 Location: Left arm, BP Patient Position: Sitting)   Pulse 77   Temp 97.9 °F (36.6 °C) (Oral)   Resp 16   Ht 5' 6\" (1.676 m)   Wt 195 lb 12.8 oz (88.8 kg)   LMP 02/12/2019 (Approximate)   SpO2 97%   BMI 31.60 kg/m²     Repeat /82 left large cuff   HEENT: normocephalic, extraocular movements intact, conjunctiva clear  Oropharynx: clear. No erythema, exudate, or drainage  Nose: no drainage  Sinuses: nontender  Ears: tympanic membrane's and canals normal.  No erythema, fluid, drainage  Neck: supple, no lymphadenopathy or thyromegaly  Breast: had breast exam with Gyn this month  Heart[de-identified] normal rate, regular rhythm, normal S1, S2, no murmurs, rubs, clicks or gallops.   Chest: clear to auscultation, no wheezes, rales or rhonchi,   Abdomen: soft, non tender, no mass or hepatosplenomegaly   Extremities: no edema  : deferred to Gyn  Psych: normal mood and affect    ASSESSMENT and PLAN  status post gastric sleeve: stable, and followed by surgery annually. Will repeat some labs, as last checked 6/18  H/O B12 deficiency: repeat B12, Folate  H/O Vitamin D deficiency: repeat vitamin D  H/O anxiety Stable off medication  Obesity: patient continues to work with her Gastric surgeon, and weight has been stable  Sleep Apnea: resolved post gastric sleeve  Health Maintenance: labs ordered,   Up to date on Td.  Declines Flu shot  She has had a Pap and will schedule her mammogram  follow up 1 year or prn

## 2021-04-01 ENCOUNTER — OFFICE VISIT (OUTPATIENT)
Dept: INTERNAL MEDICINE CLINIC | Age: 46
End: 2021-04-01
Payer: COMMERCIAL

## 2021-04-01 VITALS
WEIGHT: 188 LBS | OXYGEN SATURATION: 100 % | RESPIRATION RATE: 18 BRPM | HEIGHT: 66 IN | TEMPERATURE: 97.5 F | DIASTOLIC BLOOD PRESSURE: 85 MMHG | SYSTOLIC BLOOD PRESSURE: 133 MMHG | HEART RATE: 78 BPM | BODY MASS INDEX: 30.22 KG/M2

## 2021-04-01 DIAGNOSIS — F41.8 DEPRESSION WITH ANXIETY: ICD-10-CM

## 2021-04-01 DIAGNOSIS — Z76.89 ENCOUNTER TO ESTABLISH CARE: ICD-10-CM

## 2021-04-01 DIAGNOSIS — M25.511 CHRONIC RIGHT SHOULDER PAIN: ICD-10-CM

## 2021-04-01 DIAGNOSIS — Z98.890 HISTORY OF GASTRIC SURGERY: ICD-10-CM

## 2021-04-01 DIAGNOSIS — Z00.00 GENERAL MEDICAL EXAM: Primary | ICD-10-CM

## 2021-04-01 DIAGNOSIS — E53.8 VITAMIN B12 DEFICIENCY: ICD-10-CM

## 2021-04-01 DIAGNOSIS — G89.29 CHRONIC RIGHT SHOULDER PAIN: ICD-10-CM

## 2021-04-01 DIAGNOSIS — H93.8X3 EAR CONGESTION, BILATERAL: ICD-10-CM

## 2021-04-01 DIAGNOSIS — E66.9 MILD OBESITY: ICD-10-CM

## 2021-04-01 DIAGNOSIS — E55.9 VITAMIN D DEFICIENCY: ICD-10-CM

## 2021-04-01 PROCEDURE — 99396 PREV VISIT EST AGE 40-64: CPT | Performed by: NURSE PRACTITIONER

## 2021-04-01 PROCEDURE — 99213 OFFICE O/P EST LOW 20 MIN: CPT | Performed by: NURSE PRACTITIONER

## 2021-04-01 RX ORDER — DULOXETIN HYDROCHLORIDE 30 MG/1
30 CAPSULE, DELAYED RELEASE ORAL DAILY
Qty: 30 CAP | Refills: 1 | Status: SHIPPED | OUTPATIENT
Start: 2021-04-01 | End: 2021-05-03 | Stop reason: ALTCHOICE

## 2021-04-01 RX ORDER — LIRAGLUTIDE 6 MG/ML
INJECTION, SOLUTION SUBCUTANEOUS
COMMUNITY
Start: 2021-03-16 | End: 2022-06-27

## 2021-04-01 NOTE — ASSESSMENT & PLAN NOTE
Lengthy counseling with her today. Recommend counseling-list of providers given and she will schedule appt. Will start Cymbalta 30 mg once daily-educated about med. May also help with musculoskeletal pain. Plan follow up in 3-4 weeks or sooner if any worsening.

## 2021-04-01 NOTE — PATIENT INSTRUCTIONS
Well Visit, Ages 25 to 48: Care Instructions  Overview     Well visits can help you stay healthy. Your doctor has checked your overall health and may have suggested ways to take good care of yourself. Your doctor also may have recommended tests. At home, you can help prevent illness with healthy eating, regular exercise, and other steps. Follow-up care is a key part of your treatment and safety. Be sure to make and go to all appointments, and call your doctor if you are having problems. It's also a good idea to know your test results and keep a list of the medicines you take. How can you care for yourself at home? · Get screening tests that you and your doctor decide on. Screening helps find diseases before any symptoms appear. · Eat healthy foods. Choose fruits, vegetables, whole grains, protein, and low-fat dairy foods. Limit fat, especially saturated fat. Reduce salt in your diet. · Limit alcohol. If you are a man, have no more than 2 drinks a day or 14 drinks a week. If you are a woman, have no more than 1 drink a day or 7 drinks a week. · Get at least 30 minutes of physical activity on most days of the week. Walking is a good choice. You also may want to do other activities, such as running, swimming, cycling, or playing tennis or team sports. Discuss any changes in your exercise program with your doctor. · Reach and stay at a healthy weight. This will lower your risk for many problems, such as obesity, diabetes, heart disease, and high blood pressure. · Do not smoke or allow others to smoke around you. If you need help quitting, talk to your doctor about stop-smoking programs and medicines. These can increase your chances of quitting for good. · Care for your mental health. It is easy to get weighed down by worry and stress. Learn strategies to manage stress, like deep breathing and mindfulness, and stay connected with your family and community.  If you find you often feel sad or hopeless, talk with your doctor. Treatment can help. · Talk to your doctor about whether you have any risk factors for sexually transmitted infections (STIs). You can help prevent STIs if you wait to have sex with a new partner (or partners) until you've each been tested for STIs. It also helps if you use condoms (male or female condoms) and if you limit your sex partners to one person who only has sex with you. Vaccines are available for some STIs, such as HPV. · Use birth control if it's important to you to prevent pregnancy. Talk with your doctor about the choices available and what might be best for you. · If you think you may have a problem with alcohol or drug use, talk to your doctor. This includes prescription medicines (such as amphetamines and opioids) and illegal drugs (such as cocaine and methamphetamine). Your doctor can help you figure out what type of treatment is best for you. · Protect your skin from too much sun. When you're outdoors from 10 a.m. to 4 p.m., stay in the shade or cover up with clothing and a hat with a wide brim. Wear sunglasses that block UV rays. Even when it's cloudy, put broad-spectrum sunscreen (SPF 30 or higher) on any exposed skin. · See a dentist one or two times a year for checkups and to have your teeth cleaned. · Wear a seat belt in the car. When should you call for help? Watch closely for changes in your health, and be sure to contact your doctor if you have any problems or symptoms that concern you. Where can you learn more? Go to http://www.[employer].com/  Enter P072 in the search box to learn more about \"Well Visit, Ages 25 to 48: Care Instructions. \"  Current as of: May 27, 2020               Content Version: 12.8  © 9717-8305 [employer], [employer]orporated. Care instructions adapted under license by [employer] (which disclaims liability or warranty for this information).  If you have questions about a medical condition or this instruction, always ask your healthcare professional. [place] [address] any warranty or liability for your use of this information.

## 2021-04-01 NOTE — ASSESSMENT & PLAN NOTE
Uncontrolled but chronic and not worsening. Will start Cymbalta 30 mg once daily. If no improvement may consider adding lidocaine patch at later date. Will monitor.

## 2021-04-01 NOTE — PROGRESS NOTES
Stone Tran is a 39 y.o. female who was seen in clinic today (4/1/2021) for a full physical and to establish care. Previously followed with Dr. Laquita Bains. Assessment & Plan:   Diagnoses and all orders for this visit:    1. General medical exam  Comments:  Recommended screenings reviewed with her. Labs ordered. Counseleing diet/exercise. WWE per GYN. Plan Tdap next visit. Advised routine vision/dental exams. Orders:  -     TSH 3RD GENERATION; Future  -     CBC WITH AUTOMATED DIFF; Future  -     METABOLIC PANEL, COMPREHENSIVE; Future  -     LIPID PANEL; Future    2. Encounter to establish care    3. History of gastric surgery  -     VITAMIN B12 & FOLATE; Future  -     IRON PROFILE; Future  -     VITAMIN D, 25 HYDROXY; Future    4. Vitamin D deficiency  -     VITAMIN D, 25 HYDROXY; Future    5. Vitamin B12 deficiency  -     VITAMIN B12 & FOLATE; Future    6. Depression with anxiety  Assessment & Plan:  Lengthy counseling with her today. Recommend counseling-list of providers given and she will schedule appt. Will start Cymbalta 30 mg once daily-educated about med. May also help with musculoskeletal pain. Plan follow up in 3-4 weeks or sooner if any worsening. Orders:  -     DULoxetine (CYMBALTA) 30 mg capsule; Take 1 Cap by mouth daily. 7. Chronic right shoulder pain  Assessment & Plan:  Uncontrolled but chronic and not worsening. Will start Cymbalta 30 mg once daily. If no improvement may consider adding lidocaine patch at later date. Will monitor. Orders:  -     DULoxetine (CYMBALTA) 30 mg capsule; Take 1 Cap by mouth daily. 8. BMI 30.0-30.9,adult    9. Mild obesity    10. Ear congestion, bilateral  Comments:  Resume Flonase 2 sprays each nostril once daily for the next week. Aware to notify the office if no improvement or if any worsening. Follow-up and Dispositions    · Return in about 4 weeks (around 4/29/2021), or if symptoms worsen or fail to improve, for Follow up. ------------------------------------------------------------------------------------------    Subjective:   Andrea Chew is here today for a full physical and to establish care. Medical hx reviewed and updated. Medication reconciliation completed with her today-med list is accurate. Works as teacher-high school. Recently broke off relationship with her fiance and moved into a different place. Admits significant anxiety and feelings of depression past 2 months. Wants a referral to a counselor and wants to discuss starting medication-states feels she needs help. Denies SI/HI. Upon review of her medical hx noted she was previously on Buspar-she states it did not help with her anxiety at that time. Follows with Dr. Lucia Jay for GYN. Uses Nuvaring. Follows with Guru Camarillo at [address] [place] [address] has been prescribing her Saxenda. She is down 70 lbs since having the gastric sleeve surgery. Health Maintenance  Immunizations:   Influenza: declined   Tetanus: not up to date - requesting to get vaccine at her follow up appt in 3-4 weeks. COVID Vaccine: Received second dose 2 weeks ago-will bring in a copy of her card for our records. Pneumonia: N/A    Cancer screening:   Cervical: reviewed guidelines, UTD, done by OBGYN, records requested  Breast: reviewed guidelines, UTD, done by OBGYN, records requested. Colorectal: guidelines reviewed, pt will check with her insurance to see if will cover for screening now at the age of 39 and let me know. Patient Care Team:  MADYSON Bryan as PCP - General (Nurse Practitioner)  Leon Gutierrez MD (Obstetrics & Gynecology)  Yeny Hinds NP (Nurse Practitioner)         The following sections were reviewed & updated as appropriate: PMH, PSH, FH, and SH.     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.021    Chronic right shoulder pain M25.511, G89.29     Past Medical History:   Diagnosis Date    Chronic right shoulder pain 4/1/2021    Depression     Joint pain     and stiffness    Morbid obesity ([address]     Sleep apnea     No CPAP     Past Surgical History:   Procedure Laterality Date    HX CHOLECYSTECTOMY      HX ORTHOPAEDIC      Right shoulder surgery    HX ORTHOPAEDIC      Pin right toe    HX OTHER SURGICAL      open heart surgery-hole in her heart-age 7    IR GASTRIC BAND ADJ W/ FLUORO      Gastric sleeve     Family History   Problem Relation Age of Onset    Hypertension Mother     Diabetes Mother     Hypertension Father     Diabetes Father     Cancer Maternal Grandmother         unsure type          Prior to Admission medications    Medication Sig Start Date End Date Taking? Authorizing Provider   Saxenda 3 mg/0.5 mL (18 mg/3 mL) pen  3/16/21  Yes Provider, Historical   famotidine (PEPCID) 20 mg tablet  3/4/16  Yes Provider, Historical   ethinyl estradiol-etonogestrel (NUVARING) 0.12-0.015 mg/24 hr vaginal ring by Intravaginally route. Yes Provider, Historical   fluticasone (FLONASE) 50 mcg/actuation nasal spray 2 Sprays by Both Nostrils route daily. 3/17/17   Lima Espinosa MD          No Known Allergies           Review of Systems   Constitutional: Negative for chills, diaphoresis, fever and malaise/fatigue. HENT: Negative for congestion, ear discharge, ear pain (does admit popping and mild congestion/pressure past several days), hearing loss, nosebleeds, sinus pain, sore throat and tinnitus. Eyes:        Blind right eye otherwise stable. Follows closely with opthalmology. Respiratory: Negative for cough, hemoptysis, sputum production, shortness of breath and wheezing. Cardiovascular: Negative for chest pain, palpitations, orthopnea, claudication and leg swelling.    Gastrointestinal: Negative for abdominal pain, blood in stool, constipation, diarrhea, heartburn (controlled with Pepcid), melena, nausea and vomiting. Appetite decreased past couple of months. Genitourinary: Negative. Musculoskeletal: Positive for joint pain (right shoulder). Negative for back pain, myalgias and neck pain. Skin: Negative. Neurological: Negative. Endo/Heme/Allergies: Negative for polydipsia. Does not bruise/bleed easily. Psychiatric/Behavioral:        See HPI         Objective:   Physical Exam  Vitals signs reviewed. Constitutional:       General: She is not in acute distress. Appearance: She is well-developed and well-groomed. She is obese. HENT:      Head: Normocephalic and atraumatic. Right Ear: Ear canal and external ear normal. No decreased hearing noted. Tympanic membrane is not erythematous. Left Ear: Ear canal and external ear normal. No decreased hearing noted. Tympanic membrane is not erythematous. Ears:      Comments: Distorted light reflex bilaterally. TM intact. Nose: Nose normal.      Mouth/Throat:      Mouth: Mucous membranes are moist.      Pharynx: Oropharynx is clear. Eyes:      General: No scleral icterus. Extraocular Movements: Extraocular movements intact. Conjunctiva/sclera: Conjunctivae normal.   Neck:      Musculoskeletal: Normal range of motion and neck supple. Thyroid: No thyromegaly. Vascular: No JVD. Cardiovascular:      Rate and Rhythm: Normal rate and regular rhythm. Pulses: Normal pulses. Heart sounds: Normal heart sounds, S1 normal and S2 normal.   Pulmonary:      Effort: Pulmonary effort is normal. No respiratory distress. Breath sounds: Normal breath sounds. Abdominal:      General: Bowel sounds are normal. There is no distension. Palpations: Abdomen is soft. There is no hepatomegaly or splenomegaly. Tenderness: There is no abdominal tenderness. Musculoskeletal: Normal range of motion. Right lower leg: No edema. Left lower leg: No edema. Lymphadenopathy:      Cervical: No cervical adenopathy. Skin:     General: Skin is warm and dry. Neurological:      Mental Status: She is alert and oriented to person, place, and time. Sensory: No sensory deficit. Motor: No weakness. Coordination: Coordination normal.      Gait: Gait normal.   Psychiatric:         Attention and Perception: Attention normal.         Mood and Affect: Mood is anxious and depressed. Affect is flat. Speech: Speech normal.         Behavior: Behavior normal. Behavior is cooperative. Thought Content: Thought content normal.         Cognition and Memory: Cognition normal.         Judgment: Judgment normal.            Visit Vitals  /85 (BP 1 Location: Left arm)   Pulse 78   Temp 97.5 °F (36.4 °C) (Temporal)   Resp 18   Ht 5' 6\" (1.676 m)   Wt 188 lb (85.3 kg)   LMP 03/02/2021   SpO2 100%   BMI 30.34 kg/m²          Advised her to call back or return to office if symptoms worsen/change/persist.  Discussed expected course/resolution/complications of diagnosis in detail with patient. Medication risks/benefits/costs/interactions/alternatives discussed with patient. She was given an after visit summary which includes diagnoses, current medications, & vitals. She expressed understanding and agrees with the diagnosis and plan.       MADYSON Porter

## 2021-04-10 LAB
CHOLEST SERPL-MCNC: 197 MG/DL (ref 100–199)
HDLC SERPL-MCNC: 98 MG/DL
LDLC SERPL CALC-MCNC: 85 MG/DL (ref 0–99)
TRIGL SERPL-MCNC: 80 MG/DL (ref 0–149)
VLDLC SERPL CALC-MCNC: 14 MG/DL (ref 5–40)

## 2021-04-19 LAB
CREATININE, EXTERNAL: 1.12
HBA1C MFR BLD HPLC: 5.1 %

## 2021-04-21 NOTE — PROGRESS NOTES
Multiple labs ordered together-only Lipid panel results received electronically-[facility] called and apparently results placed under Casandra Dunn NP-results received in paper format and will scan into chart. Results reviewed showing Lipid panel, CBC, Iron level all normal. Creatinine just slightly elevated at 1.12, remainder of CMP unremarkable. Vitamin B12 at very low normal-will recommend she start some OTC Vitamin B12 supplement with folic acid. A1C is normal at 5.1%. TSH just slightly low at 0.323-will monitor for now and plan repeat-will discuss with pt at her upcoming follow up. Vitamin D level very low at 12.1-will recommend that she start OTC Vitamin D3 5,000 unit capsule-taking 2 capsules daily with food X 3 week then decrease to 1 capsule daily for long term maintenance. The remainder of labs will have her follow up with Socorro Richards NP since he ordered them. Interventional Radiology Pre-Procedure Note    This is a 63y Male with PMH of CAD s/p stents, HFrEF (EF 40% 12/2018), DMT2, HTN, CKD, NSTEMI, Afib (on Pradaxa) found to have worsening right lung pleural effusion s/p right thoracentesis in IR. IR reconsulted for pigtail placement of pleural catheter.    Anticoagulation:  Lovonox 130mg BID- last dose 11/11 @ 5:30am      PAST MEDICAL & SURGICAL HISTORY:  Neuropathy  CAD (coronary artery disease)  MI (myocardial infarction): circa 2014  Atrial fibrillation  TIA (transient ischemic attack)  DM type 2 (diabetes mellitus, type 2)  HLD (hyperlipidemia)  HTN (hypertension), benign  S/P carotid endarterectomy: left  Status post angioplasty with stent: LULU x 3 2/7/2014       Vital Signs Last 24 Hrs  T(C): 36.6 (11 Nov 2019 13:50), Max: 37.2 (10 Nov 2019 20:24)  T(F): 97.8 (11 Nov 2019 13:50), Max: 99 (10 Nov 2019 20:24)  HR: 70 (11 Nov 2019 13:50) (70 - 104)  BP: 140/85 (11 Nov 2019 13:50) (124/63 - 147/73)  RR: 18 (11 Nov 2019 13:50) (18 - 18)  SpO2: 97% (11 Nov 2019 13:50) (93% - 97%)    Allergies:   No Known Allergies      Physical Exam: Gen: NAD; A&Ox3    Labs:                         15.2   10.50 )-----------( 277      ( 11 Nov 2019 13:37 )             49.5     11-11    137  |  90<L>  |  61<H>  ----------------------------<  271<H>  3.5   |  33<H>  |  1.74<H>    Ca    9.2      11 Nov 2019 11:47    TPro  7.2  /  Alb  3.2<L>  /  TBili  0.5  /  DBili  x   /  AST  13  /  ALT  6<L>  /  AlkPhos  89  11-11        Informed consent obtained. All questions and concerns have been addressed at this time.     Plan: Right pigtail catheter placement tomorrow 11/12 in IR. Obtained consent from patient. Spoke to NP Mackenzie to hold ALL anticoagulation tonight and tomorrow morning. Repeat stat am labs.

## 2021-05-03 ENCOUNTER — VIRTUAL VISIT (OUTPATIENT)
Dept: FAMILY MEDICINE CLINIC | Age: 46
End: 2021-05-03
Payer: COMMERCIAL

## 2021-05-03 DIAGNOSIS — F32.A MILD DEPRESSION: Primary | ICD-10-CM

## 2021-05-03 DIAGNOSIS — Z13.29 SCREENING FOR HYPOTHYROIDISM: ICD-10-CM

## 2021-05-03 PROCEDURE — 99204 OFFICE O/P NEW MOD 45 MIN: CPT | Performed by: FAMILY MEDICINE

## 2021-05-03 RX ORDER — BUPROPION HYDROCHLORIDE 100 MG/1
100 TABLET, EXTENDED RELEASE ORAL 2 TIMES DAILY
Qty: 60 TAB | Refills: 2 | Status: SHIPPED | OUTPATIENT
Start: 2021-05-03 | End: 2022-06-27

## 2021-05-03 NOTE — PROGRESS NOTES
1. Have you been to the ER, urgent care clinic since your last visit? Hospitalized since your last visit? No    2. Have you seen or consulted any other health care providers outside of the [address] [place] [address] since your last visit? Include any pap smears or colon screening. No   Last PCP in [facility]. Chief Complaint   Patient presents with    Depression     Patient states last pcp NP rx Duloxtine not helping. Patient-Reported Vitals 5/3/2021   Patient-Reported Weight 182lb      Health Maintenance Due   Topic Date Due    Hepatitis C Screening  Never done    COVID-19 Vaccine (1) Never done     Abuse Screening Questionnaire 5/3/2021   Do you ever feel afraid of your partner? N   Are you in a relationship with someone who physically or mentally threatens you? N   Is it safe for you to go home?  Y     3 most recent PHQ Screens 5/3/2021   Little interest or pleasure in doing things More than half the days   Feeling down, depressed, irritable, or hopeless More than half the days   Total Score PHQ 2 4   Trouble falling or staying asleep, or sleeping too much Nearly every day   Feeling tired or having little energy More than half the days   Poor appetite, weight loss, or overeating More than half the days   Feeling bad about yourself - or that you are a failure or have let yourself or your family down Not at all   Trouble concentrating on things such as school, work, reading, or watching TV Several days   Moving or speaking so slowly that other people could have noticed; or the opposite being so fidgety that others notice Not at all   Thoughts of being better off dead, or hurting yourself in some way Not at all   PHQ 9 Score 12     Learning Assessment 3/17/2017   PRIMARY LEARNER Patient   HIGHEST LEVEL OF EDUCATION - PRIMARY LEARNER  > 4 [address]   LEARNER PREFERENCE PRIMARY DEMONSTRATION   ANSWERED BY patient   RELATIONSHIP SELF

## 2021-05-03 NOTE — PROGRESS NOTES
Maria Luisa Trujillo is a 39 y.o. female who was seen by synchronous (real-time) audio-video technology on 5/3/2021 for Depression (Patient states last pcp NP rx Duloxtine not helping. ), New Patient, and Establish Care    Just went through a bad breakup and this has led to increased anxiety and deprssion    She was taking cymbalta but trhat did not help  Going through crying spells and feeling anxious    She had the gastric sleeve in the past. Is now taking saxenda  She goes to the [facility] times a week            Assessment & Plan:   Diagnoses and all orders for this visit:    1. Mild depression (HCC)  -     buPROPion SR (WELLBUTRIN SR) 100 mg SR tablet; Take 1 Tab by mouth two (2) times a day. -     REFERRAL TO PSYCHOLOGY  -     METABOLIC PANEL, COMPREHENSIVE; Future  -     TSH 3RD GENERATION; Future  -     T4, FREE; Future  -     T3 TOTAL; Future    Start therapy along w the medication    2. Screening for hypothyroidism  -     TSH 3RD GENERATION; Future  -     T4, FREE; Future  -     T3 TOTAL; Future            Subjective:       Prior to Admission medications    Medication Sig Start Date End Date Taking? Authorizing Provider   Saxenda 3 mg/0.5 mL (18 mg/3 mL) pen  3/16/21  Yes Provider, Historical   fluticasone (FLONASE) 50 mcg/actuation nasal spray 2 Sprays by Both Nostrils route daily. 3/17/17  Yes Salinas Lutz MD   ethinyl estradiol-etonogestrel (NUVARING) 0.12-0.015 mg/24 hr vaginal ring by Intravaginally route. Yes Provider, Historical   DULoxetine (CYMBALTA) 30 mg capsule Take 1 Cap by mouth daily.  4/1/21   [place] MADYSON Matthews   famotidine (PEPCID) 20 mg tablet  3/4/16   Provider, Historical     Patient Active Problem List    Diagnosis Date Noted    Chronic right shoulder pain 04/01/2021    Vitamin D deficiency 06/22/2016    B12 deficiency 06/22/2016    S/P laparoscopic sleeve gastrectomy 06/07/2016    Retinal detachment of right eye with multiple breaks 02/21/2014    Depression with anxiety 05/31/2011     Current Outpatient Medications   Medication Sig Dispense Refill    buPROPion SR (WELLBUTRIN SR) 100 mg SR tablet Take 1 Tab by mouth two (2) times a day. 60 Tab 2    Saxenda 3 mg/0.5 mL (18 mg/3 mL) pen       fluticasone (FLONASE) 50 mcg/actuation nasal spray 2 Sprays by Both Nostrils route daily. 1 Bottle 3    ethinyl estradiol-etonogestrel (NUVARING) 0.12-0.015 mg/24 hr vaginal ring by Intravaginally route.       famotidine (PEPCID) 20 mg tablet          ROS    Objective:     Patient-Reported Vitals 5/3/2021   Patient-Reported Weight 182lb        [INSTRUCTIONS:  \"[x]\" Indicates a positive item  \"[]\" Indicates a negative item  -- DELETE ALL ITEMS NOT EXAMINED]    Constitutional: [x] Appears well-developed and well-nourished [x] No apparent distress      [] Abnormal -     Mental status: [x] Alert and awake  [x] Oriented to person/place/time [x] Able to follow commands    [] Abnormal -     Eyes:   EOM    [x]  Normal    [] Abnormal -   Sclera  [x]  Normal    [] Abnormal -          Discharge [x]  None visible   [] Abnormal -     HENT: [x] Normocephalic, atraumatic  [] Abnormal -   [x] Mouth/Throat: Mucous membranes are moist    External Ears [x] Normal  [] Abnormal -    Neck: [x] No visualized mass [] Abnormal -     Pulmonary/Chest: [x] Respiratory effort normal   [x] No visualized signs of difficulty breathing or respiratory distress        [] Abnormal -      Musculoskeletal:   [x] Normal gait with no signs of ataxia         [x] Normal range of motion of neck        [] Abnormal -     Neurological:        [x] No Facial Asymmetry (Cranial nerve 7 motor function) (limited exam due to video visit)          [x] No gaze palsy        [] Abnormal -          Skin:        [x] No significant exanthematous lesions or discoloration noted on facial skin         [] Abnormal -            Psychiatric:       [x] Normal Affect [] Abnormal -        [x] No Hallucinations    Other pertinent observable physical exam findings:-        We discussed the expected course, resolution and complications of the diagnosis(es) in detail. Medication risks, benefits, costs, interactions, and alternatives were discussed as indicated. I advised her to contact the office if her condition worsens, changes or fails to improve as anticipated. She expressed understanding with the diagnosis(es) and plan. Kumar Villarreal, was evaluated through a synchronous (real-time) audio-video encounter. The patient (or guardian if applicable) is aware that this is a billable service. Verbal consent to proceed has been obtained within the past 12 months. The visit was conducted pursuant to the emergency declaration under the [facility], [address] [place] [address] authority and the [employer] and [employer] General Act. Patient identification was verified, and a caregiver was present when appropriate. The patient was located in a state where the provider was credentialed to provide care.       Darnell Staton MD

## 2021-06-08 ENCOUNTER — TELEPHONE (OUTPATIENT)
Dept: INTERNAL MEDICINE CLINIC | Age: 46
End: 2021-06-08

## 2021-06-08 NOTE — TELEPHONE ENCOUNTER
Spoke with pt to clarify which provider she considers her PCP. She will be seeing Dr. Keri Beauchamp moving forward. We will cancel appt with NP [facility] [place] tomorrow and she will call her new office to obtain test for TB (lab test [facility]). Patient verbalized understanding.

## 2021-06-10 ENCOUNTER — TELEPHONE (OUTPATIENT)
Dept: FAMILY MEDICINE CLINIC | Age: 46
End: 2021-06-10

## 2021-06-10 NOTE — TELEPHONE ENCOUNTER
Two patient Identification confirmed. [facility] screening for employment. No action needed.

## 2021-06-10 NOTE — TELEPHONE ENCOUNTER
Pt is scheduled on 7/12/21, I tried to reach to see if a earlier appt is needed, because she is a new pt. Pt has an [employer] screening sysytem for calls and pt did not accept call.     Please advise      ----- Message from Oscar Mendes sent at 6/8/2021 11:30 AM EDT -----  Regarding: Dr. Aury Fung Message/Vendor Calls    Caller's first and last name: n/a      Reason for call: Needs an order for TB screening for employment      Callback required yes/no and why: yes       Best contact number(s): [phone]      Details to clarify the request: n/a      Oscar Mendes

## 2022-02-02 ENCOUNTER — OFFICE VISIT (OUTPATIENT)
Dept: FAMILY MEDICINE CLINIC | Age: 47
End: 2022-02-02
Payer: COMMERCIAL

## 2022-02-02 ENCOUNTER — HOSPITAL ENCOUNTER (OUTPATIENT)
Dept: GENERAL RADIOLOGY | Age: 47
Discharge: HOME | End: 2022-02-02
Attending: FAMILY MEDICINE
Payer: COMMERCIAL

## 2022-02-02 VITALS
DIASTOLIC BLOOD PRESSURE: 80 MMHG | TEMPERATURE: 97.8 F | WEIGHT: 210.6 LBS | OXYGEN SATURATION: 99 % | HEIGHT: 66 IN | BODY MASS INDEX: 33.85 KG/M2 | RESPIRATION RATE: 20 BRPM | HEART RATE: 67 BPM | SYSTOLIC BLOOD PRESSURE: 123 MMHG

## 2022-02-02 DIAGNOSIS — R07.81 RIB PAIN ON RIGHT SIDE: ICD-10-CM

## 2022-02-02 DIAGNOSIS — R07.81 RIB PAIN ON RIGHT SIDE: Primary | ICD-10-CM

## 2022-02-02 PROCEDURE — 99213 OFFICE O/P EST LOW 20 MIN: CPT | Performed by: FAMILY MEDICINE

## 2022-02-02 PROCEDURE — 71111 X-RAY EXAM RIBS/CHEST4/> VWS: CPT

## 2022-02-02 RX ORDER — CYCLOBENZAPRINE HCL 10 MG
10 TABLET ORAL
Qty: 20 TABLET | Refills: 0 | Status: SHIPPED | OUTPATIENT
Start: 2022-02-02 | End: 2022-06-27

## 2022-02-02 RX ORDER — AMOXICILLIN AND CLAVULANATE POTASSIUM 875; 125 MG/1; MG/1
TABLET, FILM COATED ORAL
COMMUNITY
Start: 2022-01-27 | End: 2022-06-27

## 2022-02-02 RX ORDER — DICLOFENAC SODIUM 75 MG/1
75 TABLET, DELAYED RELEASE ORAL
Qty: 60 TABLET | Refills: 0 | Status: SHIPPED | OUTPATIENT
Start: 2022-02-02

## 2022-02-02 RX ORDER — IBUPROFEN 600 MG/1
TABLET ORAL
COMMUNITY
Start: 2022-01-27 | End: 2022-06-27

## 2022-02-02 NOTE — PROGRESS NOTES
2022   Rick Wiseman (: 1975) is a 55 y.o. female, established patient, here for evaluation of the following chief complaint(s):  Abdominal Pain (Right side pain     Started 2 wks ago    Went to [place] [facility]    Tried  600mg Advil    WBC elevated per Ms. Connors Press    Pain worse)     ASSESSMENT/PLAN:  Below is the assessment and plan developed based on review of pertinent history, physical exam, labs, studies, and medications. 1. Rib pain on right side  -     diclofenac EC (VOLTAREN) 75 mg EC tablet; Take 1 Tablet by mouth two (2) times daily as needed for Pain., Normal, Disp-60 Tablet, R-0  -     cyclobenzaprine (FLEXERIL) 10 mg tablet; Take 1 Tablet by mouth three (3) times daily as needed for Muscle Spasm(s). , Normal, Disp-20 Tablet, R-0  -     XR RIBS BI W PA CHEST 4 VS; Future    Return if symptoms worsen or fail to improve. SUBJECTIVE/OBJECTIVE:  HPI   1. Rib pain on right side  Patient reports right-sided rib pain. This started 2 weeks ago. Patient recently went to ER due to uncontrolled pain. In the ER all the work-up including labs and CT abdominal came back negative. Patient was given a shot of Toradol which improved her pain. UA showed some signs of infection so patient was started on Augmentin for possible UTI. Patient reports he only has 1 day left of Augmentin. Patient was advised to take Motrin 600 mg for pain. Patient reports she is unable to sleep on right side due to pain. Pain is 10 out of 10. It starts from right side ribs and radiates towards right side back. I have reviewed all work-up done in ER. On exam patient is tender on palpation of her rib cage. There is no redness in the abdomen. Patient denies any chest pain or shortness of breath. Patient denies any trauma fall or injury. There is no rash. Based on my exam, it is possibly a musculoskeletal pain which is reproducible on palpation of the ribs. I will start NSAIDs and muscle relaxer.   Patient to hold off of ibuprofen while taking Voltaren. Advised her to take Voltaren daily with food. Results for orders placed or performed during the hospital encounter of 02/02/22   XR RIBS BI W PA CHEST 4 VS    Narrative    EXAM:  XR RIBS BI W PA CHEST 4 VS    INDICATION:   Right lower rib pain for 2 weeks. COMPARISON: None. FINDINGS: Single view the chest and 3 views of the right ribs were obtained. The  lungs are clear with no focal consolidation, pleural effusion or pneumothorax. Heart size is within normal limits. No evidence of acute rib fracture. Sigmoid  scoliosis of the thoracolumbar spine. Cholecystectomy clips. Impression    1. No evidence of acute rib fracture. No acute cardiopulmonary process. Billing note: The Facility order (procedure) was incorrect at the time of  interpretation and signature of this exam.? This discrepancy may have been  corrected after final signature. Review of Systems   Constitutional: Negative. HENT: Negative. Eyes: Negative. Respiratory: Negative. Cardiovascular: Negative. Gastrointestinal: Negative. Genitourinary: Negative. Musculoskeletal: Positive for back pain. Right-sided rib pain, right-sided chest wall pain, right-sided back pain   Skin: Negative. Neurological: Negative. Endo/Heme/Allergies: Negative. Psychiatric/Behavioral: Negative. Physical Exam  Vitals and nursing note reviewed. HENT:      Head: Normocephalic and atraumatic. Right Ear: External ear normal.      Left Ear: External ear normal.      Nose: Nose normal.   Eyes:      Conjunctiva/sclera: Conjunctivae normal.   Cardiovascular:      Rate and Rhythm: Normal rate and regular rhythm. Pulmonary:      Effort: Pulmonary effort is normal.      Breath sounds: Normal breath sounds. Chest:      Chest wall: Tenderness present. Abdominal:      General: Bowel sounds are normal. There is no distension. Palpations: Abdomen is soft. Tenderness: There is no abdominal tenderness. Musculoskeletal:         General: Normal range of motion. Cervical back: Normal range of motion and neck supple. Skin:     General: Skin is warm and dry. Neurological:      Mental Status: She is alert. /80 (BP 1 Location: Right arm, BP Patient Position: Sitting, BP Cuff Size: Adult)   Pulse 67   Temp 97.8 °F (36.6 °C) (Temporal)   Resp 20   Ht 5' 6\" (1.676 m)   Wt 210 lb 9.6 oz (95.5 kg)   SpO2 99%   BMI 33.99 kg/m²     No Known Allergies    Current Outpatient Medications   Medication Sig    diclofenac EC (VOLTAREN) 75 mg EC tablet Take 1 Tablet by mouth two (2) times daily as needed for Pain.  cyclobenzaprine (FLEXERIL) 10 mg tablet Take 1 Tablet by mouth three (3) times daily as needed for Muscle Spasm(s).  buPROPion SR (WELLBUTRIN SR) 100 mg SR tablet Take 1 Tab by mouth two (2) times a day.  Saxenda 3 mg/0.5 mL (18 mg/3 mL) pen     fluticasone (FLONASE) 50 mcg/actuation nasal spray 2 Sprays by Both Nostrils route daily.  ethinyl estradiol-etonogestrel (NUVARING) 0.12-0.015 mg/24 hr vaginal ring by Intravaginally route.  amoxicillin-clavulanate (AUGMENTIN) 875-125 mg per tablet TAKE 1 TABLET BY MOUTH TWICE A DAY FOR 7 DAYS    ibuprofen (MOTRIN) 600 mg tablet TAKE 1 TABLET (600 MG TOTAL) BY MOUTH EVERY 6 (SIX) HOURS AS NEEDED FOR PAIN OR FEVER    famotidine (PEPCID) 20 mg tablet  (Patient not taking: Reported on 2/2/2022)     No current facility-administered medications for this visit.        Past Medical History:   Diagnosis Date    Chronic right shoulder pain 4/1/2021    Depression     Joint pain     and stiffness    Morbid obesity ([address]     Sleep apnea     No CPAP       Past Surgical History:   Procedure Laterality Date    HX CHOLECYSTECTOMY      HX ORTHOPAEDIC      Right shoulder surgery    HX ORTHOPAEDIC      Pin right toe    HX OTHER SURGICAL      open heart surgery-hole in her heart-age 7    IR GASTRIC BAND ADJ W/ FLUORO      Gastric sleeve       Social History:  reports that she has never smoked. She has never used smokeless tobacco. She reports that she does not drink alcohol and does not use drugs. Patient Care Team:  Bjorn Carver MD as PCP - General (Family Medicine)  Bjorn Carver MD as PCP - [facility]  Barron Dorantes MD (Obstetrics & Gynecology)  Blaine Vann NP (Nurse Practitioner)    Problem List  Date Reviewed: 2/2/2022          Codes Class Noted    Chronic right shoulder pain ICD-10-CM: M25.511, G89.29  ICD-9-CM: 719.41, 338.29  4/1/2021        Vitamin D deficiency ICD-10-CM: E55.9  ICD-9-CM: 268.9  6/22/2016        B12 deficiency ICD-10-CM: E53.8  ICD-9-CM: 266.2  6/22/2016        S/P laparoscopic sleeve gastrectomy ICD-10-CM: Z98.84  ICD-9-CM: V45.86  6/7/2016        Retinal detachment of right eye with multiple breaks ICD-10-CM: H33.021  ICD-9-CM: 361.02  2/21/2014    Overview Signed 4/1/2021  8:51 AM by MADYSON Brown     Formatting of this note might be different from the original.  1. History of recurrent retinal detachment status post multiple repairs by outside provider, last in July 2013. 2. History of retinal tears, left eye, status post retinopexy  3. High myopia with mild myopic degeneration, both eyes  4. Posterior vitreous detachment, left eye  5. Pseudophakia, right eye  6. Early nuclear sclerosis cataract, left eye    Last Assessment & Plan:   Formatting of this note might be different from the original.  Emi Lias is attached OU. Vision likely limited by damage to photoreceptors/RPE from recurrent macula off detachment. Do not recommend further surgery. Explained to patient that she appears to have received excellent care. Here retina is attached in both eyes.   Recommend continued observation  Retinal detachment precautions  [place] today if possible  F/u PRN             Depression with anxiety ICD-10-CM: F41.8  ICD-9-CM: 300.4 5/31/2011                   I ADVISED PATIENT TO GO TO ER IF SYMPTOMS WORSEN , CHANGE OR FAILS TO IMPROVE. I have discussed the diagnosis with the patient and the intended plan as seen in the above orders. The patient has received an after-visit summary and questions were answered concerning future plans. I have discussed medication side effects and warnings with the patient as well. The patient agrees and understands above plan. An electronic signature was used to authenticate this note.   -- Paula Bocanegra MD

## 2022-02-02 NOTE — PROGRESS NOTES
Patient stated name &     Chief Complaint   Patient presents with    Abdominal Pain     Right side pain     Started 2 wks ago    Went to [place] ER    Tried  600mg Advil    WBC elevated per Ms. Sebastien Sapp    Pain worse        Health Maintenance Due   Topic    Hepatitis C Screening     Colorectal Cancer Screening Combo     Flu Vaccine (1)       Wt Readings from Last 3 Encounters:   22 210 lb 9.6 oz (95.5 kg)   21 188 lb (85.3 kg)   19 195 lb 12.8 oz (88.8 kg)     Temp Readings from Last 3 Encounters:   22 97.8 °F (36.6 °C) (Temporal)   21 97.5 °F (36.4 °C) (Temporal)   19 97.9 °F (36.6 °C) (Oral)     BP Readings from Last 3 Encounters:   22 123/80   21 133/85   19 128/88     Pulse Readings from Last 3 Encounters:   22 67   21 78   19 77         Learning Assessment:  :     Learning Assessment 3/17/2017   PRIMARY LEARNER Patient   HIGHEST LEVEL OF EDUCATION - PRIMARY LEARNER  > 4 YEARS OF COLLEGE   PRIMARY LANGUAGE ENGLISH   LEARNER PREFERENCE PRIMARY DEMONSTRATION   ANSWERED BY patient   RELATIONSHIP SELF       Depression Screening:  :     3 most recent PHQ Screens 2022   Little interest or pleasure in doing things Not at all   Feeling down, depressed, irritable, or hopeless Not at all   Total Score PHQ 2 0   Trouble falling or staying asleep, or sleeping too much -   Feeling tired or having little energy -   Poor appetite, weight loss, or overeating -   Feeling bad about yourself - or that you are a failure or have let yourself or your family down -   Trouble concentrating on things such as school, work, reading, or watching TV -   Moving or speaking so slowly that other people could have noticed; or the opposite being so fidgety that others notice -   Thoughts of being better off dead, or hurting yourself in some way -   PHQ 9 Score -       Fall Risk Assessment:  :     Fall Risk Assessment, last 12 mths 2022   Able to walk?  Yes Fall in past 12 months? 0   Do you feel unsteady? 0   Are you worried about falling 0       Abuse Screening:  :     Abuse Screening Questionnaire 2/2/2022 5/3/2021   Do you ever feel afraid of your partner? N N   Are you in a relationship with someone who physically or mentally threatens you? N N   Is it safe for you to go home? Y Y       Coordination of Care Questionnaire:  :     1) Have you been to an emergency room, urgent care clinic since your last visit? no   Hospitalized since your last visit? no             2) Have you seen or consulted any other health care providers outside of [address] [place] [address] since your last visit? no  (Include any pap smears or colon screenings in this section.)    3) Do you have an Advance Directive on file? no  Are you interested in receiving information about Advance Directives? no    Patient is accompanied by self I have received verbal consent from Von Huitron to discuss any/all medical information while they are present in the room.

## 2022-02-02 NOTE — PROGRESS NOTES
Please call inform patient, rib x-ray is within normal limits. Patient to continue anti-inflammatory medication and muscle relaxer as prescribed. If no improvement, patient to follow-up.   Thanks

## 2022-02-02 NOTE — LETTER
2/2/2022    [address] [id number]      Dear Jin Mendosa:    Please find your most recent results below. Resulted Orders   XR RIBS BI W PA CHEST 4 VS    Narrative    EXAM:  XR RIBS BI W PA CHEST 4 VS    INDICATION:   Right lower rib pain for 2 weeks. COMPARISON: None. FINDINGS: Single view the chest and 3 views of the right ribs were obtained. The  lungs are clear with no focal consolidation, pleural effusion or pneumothorax. Heart size is within normal limits. No evidence of acute rib fracture. Sigmoid  scoliosis of the thoracolumbar spine. Cholecystectomy clips. Impression    1. No evidence of acute rib fracture. No acute cardiopulmonary process. Billing note: The Facility order (procedure) was incorrect at the time of  interpretation and signature of this exam.? This discrepancy may have been  corrected after final signature.            RECOMMENDATIONS:  X-ray is normal    Please call me if you have any questions: [phone]    Sincerely,      Paula Bocanegra MD

## 2022-02-03 ENCOUNTER — TELEPHONE (OUTPATIENT)
Dept: FAMILY MEDICINE CLINIC | Age: 47
End: 2022-02-03

## 2022-02-03 NOTE — TELEPHONE ENCOUNTER
Left message below on her voice mail. Please call inform patient, rib x-ray is within normal limits. Patient to continue anti-inflammatory medication and muscle relaxer as prescribed. If no improvement, patient to follow-up.   Thanks

## 2022-03-18 PROBLEM — M25.511 CHRONIC RIGHT SHOULDER PAIN: Status: ACTIVE | Noted: 2021-04-01

## 2022-03-18 PROBLEM — G89.29 CHRONIC RIGHT SHOULDER PAIN: Status: ACTIVE | Noted: 2021-04-01

## 2022-06-27 ENCOUNTER — OFFICE VISIT (OUTPATIENT)
Dept: FAMILY MEDICINE CLINIC | Age: 47
End: 2022-06-27
Payer: COMMERCIAL

## 2022-06-27 VITALS
DIASTOLIC BLOOD PRESSURE: 84 MMHG | BODY MASS INDEX: 35.71 KG/M2 | OXYGEN SATURATION: 100 % | WEIGHT: 222.2 LBS | TEMPERATURE: 98.1 F | RESPIRATION RATE: 20 BRPM | SYSTOLIC BLOOD PRESSURE: 127 MMHG | HEIGHT: 66 IN | HEART RATE: 69 BPM

## 2022-06-27 DIAGNOSIS — F32.A MILD DEPRESSION: ICD-10-CM

## 2022-06-27 DIAGNOSIS — N39.0 URINARY TRACT INFECTION WITHOUT HEMATURIA, SITE UNSPECIFIED: Primary | ICD-10-CM

## 2022-06-27 DIAGNOSIS — L29.9 ITCHING: ICD-10-CM

## 2022-06-27 DIAGNOSIS — Z51.81 MEDICATION MONITORING ENCOUNTER: ICD-10-CM

## 2022-06-27 LAB
BILIRUB UR QL STRIP: NEGATIVE
GLUCOSE UR-MCNC: NEGATIVE MG/DL
KETONES P FAST UR STRIP-MCNC: NEGATIVE MG/DL
PH UR STRIP: 6 [PH] (ref 4.6–8)
PROT UR QL STRIP: NEGATIVE
SP GR UR STRIP: 1.02 (ref 1–1.03)
UA UROBILINOGEN AMB POC: NORMAL (ref 0.2–1)
URINALYSIS CLARITY POC: CLEAR
URINALYSIS COLOR POC: YELLOW
URINE BLOOD POC: NORMAL
URINE LEUKOCYTES POC: NORMAL
URINE NITRITES POC: NEGATIVE

## 2022-06-27 PROCEDURE — 99214 OFFICE O/P EST MOD 30 MIN: CPT | Performed by: FAMILY MEDICINE

## 2022-06-27 PROCEDURE — 81001 URINALYSIS AUTO W/SCOPE: CPT | Performed by: FAMILY MEDICINE

## 2022-06-27 RX ORDER — BUPROPION HYDROCHLORIDE 100 MG/1
100 TABLET, EXTENDED RELEASE ORAL 2 TIMES DAILY
Qty: 60 TABLET | Refills: 2 | Status: SHIPPED | OUTPATIENT
Start: 2022-06-27

## 2022-06-27 RX ORDER — NITROFURANTOIN 25; 75 MG/1; MG/1
100 CAPSULE ORAL 2 TIMES DAILY
Qty: 14 CAPSULE | Refills: 0 | Status: SHIPPED | OUTPATIENT
Start: 2022-06-27

## 2022-06-27 NOTE — PROGRESS NOTES
Patient stated name &     Chief Complaint   Patient presents with    Allergic Reaction     All over on and off    Started about 5 months ago     More frequent    No rash, just itching    Tried Benedryl    Medication Refill     Fentermine    Requesting EKG    Bladder Infection     Frequestn urination        Health Maintenance Due   Topic    Hepatitis C Screening     Colorectal Cancer Screening Combo        Wt Readings from Last 3 Encounters:   22 222 lb 3.2 oz (100.8 kg)   22 210 lb 9.6 oz (95.5 kg)   21 188 lb (85.3 kg)     Temp Readings from Last 3 Encounters:   22 98.1 °F (36.7 °C) (Temporal)   22 97.8 °F (36.6 °C) (Temporal)   21 97.5 °F (36.4 °C) (Temporal)     BP Readings from Last 3 Encounters:   22 127/84   22 123/80   21 133/85     Pulse Readings from Last 3 Encounters:   22 69   22 67   21 78         Learning Assessment:  :     Learning Assessment 3/17/2017   PRIMARY LEARNER Patient   HIGHEST LEVEL OF EDUCATION - PRIMARY LEARNER  > 4 YEARS OF COLLEGE   PRIMARY LANGUAGE ENGLISH   LEARNER PREFERENCE PRIMARY DEMONSTRATION   ANSWERED BY patient   RELATIONSHIP SELF       Depression Screening:  :     3 most recent PHQ Screens 2022   Little interest or pleasure in doing things Not at all   Feeling down, depressed, irritable, or hopeless Not at all   Total Score PHQ 2 0   Trouble falling or staying asleep, or sleeping too much -   Feeling tired or having little energy -   Poor appetite, weight loss, or overeating -   Feeling bad about yourself - or that you are a failure or have let yourself or your family down -   Trouble concentrating on things such as school, work, reading, or watching TV -   Moving or speaking so slowly that other people could have noticed; or the opposite being so fidgety that others notice -   Thoughts of being better off dead, or hurting yourself in some way -   PHQ 9 Score -       Fall Risk Assessment:  : Fall Risk Assessment, last 12 mths 2/2/2022   Able to walk? Yes   Fall in past 12 months? 0   Do you feel unsteady? 0   Are you worried about falling 0       Abuse Screening:  :     Abuse Screening Questionnaire 2/2/2022 5/3/2021   Do you ever feel afraid of your partner? N N   Are you in a relationship with someone who physically or mentally threatens you? N N   Is it safe for you to go home? Y Y       Coordination of Care Questionnaire:  :     1) Have you been to an emergency room, urgent care clinic since your last visit? no   Hospitalized since your last visit? no             2) Have you seen or consulted any other health care providers outside of [address] [place] [address] since your last visit? no  (Include any pap smears or colon screenings in this section.)    3) Do you have an Advance Directive on file? no  Are you interested in receiving information about Advance Directives? no    Patient is accompanied by self I have received verbal consent from Mary Polanco to discuss any/all medical information while they are present in the room.

## 2022-06-27 NOTE — PROGRESS NOTES
2022   Sonia Hughes (: 1975) is a 55 y.o. female, established patient, here for evaluation of the following chief complaint(s): Allergic Reaction (All over on and off    Started about 5 months ago     More frequent    No rash, just itching    Tried Benedryl), Medication Refill (Fentermine    Requesting EKG), and Bladder Infection Debbie Gurdeep urination)     ASSESSMENT/PLAN:  Below is the assessment and plan developed based on review of pertinent history, physical exam, labs, studies, and medications. 1. Urinary tract infection without hematuria, site unspecified  -     AMB POC URINALYSIS DIP STICK AUTO W/ MICRO  -     CULTURE, URINE; Future  -     nitrofurantoin, macrocrystal-monohydrate, (Macrobid) 100 mg capsule; Take 1 Capsule by mouth two (2) times a day., Normal, Disp-14 Capsule, R-0  2. Medication monitoring encounter  -     AMB POC EKG ROUTINE W/ 12 LEADS, INTER & REP  3. Itching  -     REFERRAL TO ALLERGY  4. Mild depression  -     buPROPion SR (WELLBUTRIN SR) 100 mg SR tablet; Take 1 Tablet by mouth two (2) times a day., Normal, Disp-60 Tablet, R-2    Return if symptoms worsen or fail to improve. SUBJECTIVE/OBJECTIVE:  HPI   1. Urinary tract infection without hematuria, site unspecified    Patient complains of frequency. Onset was 1 day ago, unchanged since that time. Patient denies back pain and fever. Patient does not have a history of recurrent UTI. Patient does not have a history of pyelonephritis. 2. Medication monitoring encounter  Patient is requesting an EKG. Requested by her weight loss physician. She needs EKG done to get new prescription for phentermine. Patient is currently not on phentermine. 3. Itching  Patient reports symptoms of allergies. She reports intermittent itching all over her body. She does not have any documented allergies. She denies use of any new detergent or new products. She denies any rash. Symptoms started 5 months ago.   She takes Benadryl as needed. I have advised to start Guerraview. I will refer to allergy for testing. 4. Mild depression  Patient reports symptoms of depression and anxiety. She was previously on Wellbutrin for similar symptoms. This was also helping her suppress her appetite. She is requesting a refill of Wellbutrin. She denies suicidal or homicidal ideation. Results for orders placed or performed in visit on 06/27/22   AMB POC URINALYSIS DIP STICK AUTO W/ MICRO   Result Value Ref Range    Color (UA POC) Yellow     Clarity (UA POC) Clear     Glucose (UA POC) Negative Negative    Bilirubin (UA POC) Negative Negative    Ketones (UA POC) Negative Negative    Specific gravity (UA POC) 1.025 1.001 - 1.035    Blood (UA POC) 2+ Negative    pH (UA POC) 6.0 4.6 - 8.0    Protein (UA POC) Negative Negative    Urobilinogen (UA POC) 1 mg/dL 0.2 - 1    Nitrites (UA POC) Negative Negative    Leukocyte esterase (UA POC) 1+ Negative                Review of Systems   Constitutional: Negative. HENT: Negative. Eyes: Negative. Respiratory: Negative. Cardiovascular: Negative. Gastrointestinal: Negative. Genitourinary: Positive for frequency. Musculoskeletal: Negative. Skin: Positive for itching. Neurological: Negative. Endo/Heme/Allergies: Negative. Psychiatric/Behavioral: Negative. Physical Exam  Vitals and nursing note reviewed. HENT:      Head: Normocephalic and atraumatic. Right Ear: External ear normal.      Left Ear: External ear normal.      Nose: Nose normal.   Eyes:      Conjunctiva/sclera: Conjunctivae normal.   Cardiovascular:      Rate and Rhythm: Normal rate and regular rhythm. Pulmonary:      Effort: Pulmonary effort is normal.      Breath sounds: Normal breath sounds. Abdominal:      General: Bowel sounds are normal. There is no distension. Palpations: Abdomen is soft. Tenderness: There is no abdominal tenderness.    Musculoskeletal:         General: Normal range of motion. Cervical back: Normal range of motion and neck supple. Skin:     General: Skin is warm and dry. Neurological:      General: No focal deficit present. Mental Status: She is alert. /84 (BP 1 Location: Left upper arm, BP Patient Position: Sitting, BP Cuff Size: Adult)   Pulse 69   Temp 98.1 °F (36.7 °C) (Temporal)   Resp 20   Ht 5' 6\" (1.676 m)   Wt 222 lb 3.2 oz (100.8 kg)   SpO2 100%   BMI 35.86 kg/m²     No Known Allergies    Current Outpatient Medications   Medication Sig    nitrofurantoin, macrocrystal-monohydrate, (Macrobid) 100 mg capsule Take 1 Capsule by mouth two (2) times a day.  buPROPion SR (WELLBUTRIN SR) 100 mg SR tablet Take 1 Tablet by mouth two (2) times a day.  diclofenac EC (VOLTAREN) 75 mg EC tablet Take 1 Tablet by mouth two (2) times daily as needed for Pain.  fluticasone (FLONASE) 50 mcg/actuation nasal spray 2 Sprays by Both Nostrils route daily.  ethinyl estradiol-etonogestrel (NUVARING) 0.12-0.015 mg/24 hr vaginal ring by Intravaginally route. No current facility-administered medications for this visit. Past Medical History:   Diagnosis Date    Chronic right shoulder pain 4/1/2021    Depression     Joint pain     and stiffness    Morbid obesity ([address]     Sleep apnea     No CPAP       Past Surgical History:   Procedure Laterality Date    HX CHOLECYSTECTOMY      HX ORTHOPAEDIC      Right shoulder surgery    HX ORTHOPAEDIC      Pin right toe    HX OTHER SURGICAL      open heart surgery-hole in her heart-age 7    IR GASTRIC BAND ADJ W/ FLUORO      Gastric sleeve       Social History:  reports that she has never smoked. She has never used smokeless tobacco. She reports that she does not drink alcohol and does not use drugs.     Patient Care Team:  Jacey Martinez MD as PCP - General (Family Medicine)  Jacey Martinez MD as PCP - [facility] Provider  Yamilet Mccloud MD (Obstetrics & Gynecology)  Gianni Rain J, NP (Nurse Practitioner)    Problem List  Date Reviewed: 2/2/2022          Codes Class Noted    Chronic right shoulder pain ICD-10-CM: M25.511, G89.29  ICD-9-CM: 719.41, 338.29  4/1/2021        Vitamin D deficiency ICD-10-CM: E55.9  ICD-9-CM: 268.9  6/22/2016        B12 deficiency ICD-10-CM: E53.8  ICD-9-CM: 266.2  6/22/2016        S/P laparoscopic sleeve gastrectomy ICD-10-CM: Z98.84  ICD-9-CM: V45.86  6/7/2016        Retinal detachment of right eye with multiple breaks ICD-10-CM: H33.021  ICD-9-CM: 361.02  2/21/2014    Overview Signed 4/1/2021  8:51 AM by MADYSON Arenas     Formatting of this note might be different from the original.  1. History of recurrent retinal detachment status post multiple repairs by outside provider, last in July 2013. 2. History of retinal tears, left eye, status post retinopexy  3. High myopia with mild myopic degeneration, both eyes  4. Posterior vitreous detachment, left eye  5. Pseudophakia, right eye  6. Early nuclear sclerosis cataract, left eye    Last Assessment & Plan:   Formatting of this note might be different from the original.  Gabriel Brawn is attached OU. Vision likely limited by damage to photoreceptors/RPE from recurrent macula off detachment. Do not recommend further surgery. Explained to patient that she appears to have received excellent care. Here retina is attached in both eyes. Recommend continued observation  Retinal detachment precautions  [place] today if possible  F/u PRN             Depression with anxiety ICD-10-CM: F41.8  ICD-9-CM: 300.4  5/31/2011                   I ADVISED PATIENT TO GO TO ER IF SYMPTOMS WORSEN , CHANGE OR FAILS TO IMPROVE. I have discussed the diagnosis with the patient and the intended plan as seen in the above orders. The patient has received an after-visit summary and questions were answered concerning future plans. I have discussed medication side effects and warnings with the patient as well.  The patient agrees and understands above plan. An electronic signature was used to authenticate this note.   -- Jet Ventura MD

## 2022-06-28 LAB
BACTERIA SPEC CULT: NORMAL
CC UR VC: NORMAL
SERVICE CMNT-IMP: NORMAL

## 2023-03-03 ENCOUNTER — VIRTUAL VISIT (OUTPATIENT)
Dept: FAMILY MEDICINE CLINIC | Age: 48
End: 2023-03-03

## 2023-03-03 DIAGNOSIS — R40.0 DAYTIME SOMNOLENCE: ICD-10-CM

## 2023-03-03 DIAGNOSIS — R51.9 FREQUENT HEADACHES: Primary | ICD-10-CM

## 2023-03-03 DIAGNOSIS — G47.8 UNREFRESHED BY SLEEP: ICD-10-CM

## 2023-03-03 NOTE — PROGRESS NOTES
Bird Reno is a 52 y.o. female who was seen by synchronous (real-time) audio-video technology on 3/3/2023 for Referral Request (Sleep study)    She wants a procedure for weight loss   They are requiring a sleep study  She did the sleeve in the past and there is another procedure she is talking to a surgeon about doing. She cannot recall the name of the procedure  The surgeon is in [place]    She snores, she wakes with headaches, she is unrefreshed by sleep      Assessment & Plan:   Diagnoses and all orders for this visit:    1. Frequent headaches  -     SLEEP MEDICINE REFERRAL    2. Unrefreshed by sleep  -     SLEEP MEDICINE REFERRAL    3. Daytime somnolence  -     SLEEP MEDICINE REFERRAL    She has signs of gaby, I agree she needs a sleep study  I am referring for sleep test      Subjective:       Prior to Admission medications    Medication Sig Start Date End Date Taking? Authorizing Provider   buPROPion SR [facility] SR) 100 mg SR tablet Take 1 Tablet by mouth two (2) times a day. 6/27/22  Yes Brunilda Bishop MD   fluticasone (FLONASE) 50 mcg/actuation nasal spray 2 Sprays by Both Nostrils route daily. 3/17/17  Yes Oj Santana MD   ethinyl estradiol-etonogestrel (NUVARING) 0.12-0.015 mg/24 hr vaginal ring by Intravaginally route. Yes Provider, Historical   nitrofurantoin, macrocrystal-monohydrate, (Macrobid) 100 mg capsule Take 1 Capsule by mouth two (2) times a day. Patient not taking: Reported on 3/3/2023 6/27/22   Brunilda Bishop MD   diclofenac EC (VOLTAREN) 75 mg EC tablet Take 1 Tablet by mouth two (2) times daily as needed for Pain.   Patient not taking: Reported on 3/3/2023 2/2/22   Brunilda Bishop MD     Patient Active Problem List    Diagnosis Date Noted    Chronic right shoulder pain 04/01/2021    Vitamin D deficiency 06/22/2016    B12 deficiency 06/22/2016    S/P laparoscopic sleeve gastrectomy 06/07/2016    Retinal detachment of right eye with multiple breaks 02/21/2014 Depression with anxiety 05/31/2011     Current Outpatient Medications   Medication Sig Dispense Refill    buPROPion SR (WELLBUTRIN SR) 100 mg SR tablet Take 1 Tablet by mouth two (2) times a day. 60 Tablet 2    fluticasone (FLONASE) 50 mcg/actuation nasal spray 2 Sprays by Both Nostrils route daily. 1 Bottle 3    ethinyl estradiol-etonogestrel (NUVARING) 0.12-0.015 mg/24 hr vaginal ring by Intravaginally route. nitrofurantoin, macrocrystal-monohydrate, (Macrobid) 100 mg capsule Take 1 Capsule by mouth two (2) times a day. (Patient not taking: Reported on 3/3/2023) 14 Capsule 0    diclofenac EC (VOLTAREN) 75 mg EC tablet Take 1 Tablet by mouth two (2) times daily as needed for Pain.  (Patient not taking: Reported on 3/3/2023) 60 Tablet 0       ROS    Objective:     Patient-Reported Vitals 3/3/2023   Patient-Reported Weight 235lb   Patient-Reported LMP -        [INSTRUCTIONS:  \"[x]\" Indicates a positive item  \"[]\" Indicates a negative item  -- DELETE ALL ITEMS NOT EXAMINED]    Constitutional: [x] Appears well-developed and well-nourished [x] No apparent distress      [] Abnormal -     Mental status: [x] Alert and awake  [x] Oriented to person/place/time [x] Able to follow commands    [] Abnormal -     Eyes:   EOM    [x]  Normal    [] Abnormal -   Sclera  [x]  Normal    [] Abnormal -          Discharge [x]  None visible   [] Abnormal -     HENT: [x] Normocephalic, atraumatic  [] Abnormal -   [x] Mouth/Throat: Mucous membranes are moist    External Ears [x] Normal  [] Abnormal -    Neck: [x] No visualized mass [] Abnormal -     Pulmonary/Chest: [x] Respiratory effort normal   [x] No visualized signs of difficulty breathing or respiratory distress        [] Abnormal -      Musculoskeletal:   [x] Normal gait with no signs of ataxia         [x] Normal range of motion of neck        [] Abnormal -     Neurological:        [x] No Facial Asymmetry (Cranial nerve 7 motor function) (limited exam due to video visit) [x] No gaze palsy        [] Abnormal -          Skin:        [x] No significant exanthematous lesions or discoloration noted on facial skin         [] Abnormal -            Psychiatric:       [x] Normal Affect [] Abnormal -        [x] No Hallucinations    Other pertinent observable physical exam findings:-        We discussed the expected course, resolution and complications of the diagnosis(es) in detail. Medication risks, benefits, costs, interactions, and alternatives were discussed as indicated. I advised her to contact the office if her condition worsens, changes or fails to improve as anticipated. She expressed understanding with the diagnosis(es) and plan. Salvador Contreras, was evaluated through a synchronous (real-time) audio-video encounter. The patient (or guardian if applicable) is aware that this is a billable service, which includes applicable co-pays. This Virtual Visit was conducted with patient's (and/or legal guardian's) consent. The visit was conducted pursuant to the emergency declaration under the [address] [place] [address] authority and the [employer] and [facility] General Act. Patient identification was verified, and a caregiver was present when appropriate.   The patient was located at: Home: [id number] [facility] [address]  The provider was located at: Home: Tye Jarrell MD

## 2023-03-03 NOTE — PROGRESS NOTES
1. Have you been to the ER, urgent care clinic since your last visit? Hospitalized since your last visit? No    2. Have you seen or consulted any other health care providers outside of the [address] [place] [address] since your last visit? Include any pap smears or colon screening. No    Chief Complaint   Patient presents with    Referral Request     Sleep study     3 most recent PHQ Screens 3/3/2023   Little interest or pleasure in doing things Not at all   Feeling down, depressed, irritable, or hopeless Not at all   Total Score PHQ 2 0   Trouble falling or staying asleep, or sleeping too much -   Feeling tired or having little energy -   Poor appetite, weight loss, or overeating -   Feeling bad about yourself - or that you are a failure or have let yourself or your family down -   Trouble concentrating on things such as school, work, reading, or watching TV -   Moving or speaking so slowly that other people could have noticed; or the opposite being so fidgety that others notice -   Thoughts of being better off dead, or hurting yourself in some way -   PHQ 9 Score -     Abuse Screening Questionnaire 3/3/2023   Do you ever feel afraid of your partner? N   Are you in a relationship with someone who physically or mentally threatens you? N   Is it safe for you to go home?  Y     Learning Assessment 3/17/2017   PRIMARY LEARNER Patient   HIGHEST LEVEL OF EDUCATION - PRIMARY LEARNER  > 4 YEARS OF COLLEGE   PRIMARY LANGUAGE ENGLISH   LEARNER PREFERENCE PRIMARY DEMONSTRATION   ANSWERED BY patient   RELATIONSHIP SELF     Patient-Reported Vitals 3/3/2023   Patient-Reported Weight 235lb   Patient-Reported LMP -

## 2023-03-06 ENCOUNTER — TELEPHONE (OUTPATIENT)
Dept: SLEEP MEDICINE | Age: 48
End: 2023-03-06

## 2023-03-06 ENCOUNTER — PATIENT MESSAGE (OUTPATIENT)
Dept: FAMILY MEDICINE CLINIC | Age: 48
End: 2023-03-06

## 2023-03-06 NOTE — TELEPHONE ENCOUNTER
Received new patient referral from Kae Diamond MD for snoring, frequent headaches, unfresehed by sleep, EDS and eval of LYNNE. Called to schedule, was answered by M[place]ana but received no answer from patient and voicemail was unavailable. Message sent via [address]

## 2023-06-22 ENCOUNTER — CLINICAL DOCUMENTATION (OUTPATIENT)
Age: 48
End: 2023-06-22

## 2023-07-07 ENCOUNTER — PROCEDURE VISIT (OUTPATIENT)
Age: 48
End: 2023-07-07

## 2023-07-07 DIAGNOSIS — G47.33 OSA (OBSTRUCTIVE SLEEP APNEA): Primary | ICD-10-CM

## 2023-07-07 NOTE — PROGRESS NOTES
S>Heather Bullard is a 52 y.o. female seen today to receive a home sleep testing unit 1562 (HST). Patient was educated on proper hookup and operation of the HST via detailed instruction sheet . Belts were fitted and adjusted for the patient during this session. Instruction forms with after hours contact and documentation were signed. O>    There were no vitals taken for this visit. A>  1. RODRÍGUEZ (obstructive sleep apnea)          P>  General information regarding operations and maintenance of the device was provided. Follow-up appointment was made to return the HST. She will be contacted once the results have been reviewed. She was asked to contact our office for any problems regarding her home sleep test study.

## 2023-07-08 ENCOUNTER — HOSPITAL ENCOUNTER (OUTPATIENT)
Facility: HOSPITAL | Age: 48
Discharge: HOME | End: 2023-07-11
Payer: COMMERCIAL

## 2023-07-08 PROCEDURE — G0399 HOME SLEEP TEST/TYPE 3 PORTA: HCPCS | Performed by: INTERNAL MEDICINE

## 2023-07-10 ENCOUNTER — PROCEDURE VISIT (OUTPATIENT)
Age: 48
End: 2023-07-10

## 2023-07-10 DIAGNOSIS — G47.33 OSA (OBSTRUCTIVE SLEEP APNEA): Primary | ICD-10-CM

## 2023-07-11 ENCOUNTER — TELEPHONE (OUTPATIENT)
Age: 48
End: 2023-07-11

## 2023-07-12 ENCOUNTER — CLINICAL DOCUMENTATION (OUTPATIENT)
Age: 48
End: 2023-07-12

## 2023-07-12 NOTE — TELEPHONE ENCOUNTER
Results of sleep study in [place] [employer]  Lead tech to convey results to patient    Results of HSAT in [place] [employer]    The home sleep apnea test showed AHI - 2. 6.hour. THe lowest oxygen saturation was 88%. This correlates with a test that is negative for significant sleep apnea. We had discussed treatment plan at initial consultation. Based on the results of the home sleep apnea test, APAP is not indicated at this time. 2 nights recorded. Both showed similar results    Optimizing sleep habits by keeping bedtime and waketime regular; ensuring sufficient total sleep time; avoiding caffeine after 2 pm; avoid looking at the clock during the night. Ideally, the clock face should be turned away. A regular exercise schedule, at least 3 hours before bedtime, would be beneficial to improving sleep quality. avoid evening light and to use sunglasses in the late afternoon. Watching TV, using laptops, tablets and smartphones in the evening was discouraged. keep the bedroom cool and dark. Pets should not be allowed to sleep in the bed. Repeat HSAT is indicated if symptoms worsen.

## 2025-07-15 NOTE — PROGRESS NOTES
Heather Sterling (:  1975) is a 49 y.o. female, New patient, here for evaluation of the following chief complaint(s):  New Patient (Patient states that she wants to establish care.)         Assessment & Plan  1. Depression.  - Symptoms include low mood, lack of enjoyment in usual activities, and sleep disturbances.  - Wellbutrin  mg prescribed to be taken in the morning; hydroxyzine 10 mg prescribed for use as needed at bedtime.  - Advised to continue the current regimen and provide an update on her condition via MyChart in one week. If anxiety occurs, she should inform us to consider discontinuing Wellbutrin and exploring alternative treatments.  - Discussed the importance of maintaining medication consistency and potential tapering off after achieving balance.    2. Blood clot.  - Blood clot likely provoked by dehydration post-surgery.  - Currently taking Eliquis; started on 2025, initially twice daily for 7 days, now once daily.  - Duration of Eliquis treatment to be confirmed; refill will be provided if necessary.  - Blood clot located in the right knee.    3. Health maintenance/preventive health exam.  - Mammogram ordered due to the last one being overdue.  - Cologuard test ordered for colon cancer screening as an alternative to colonoscopy.  - Recent Pap smear was normal; records to be obtained from Dr. Spencer Kaufman.          Results    1. Encounter to establish care  2. Well adult exam  -     Lipid Panel; Future  -     Hemoglobin A1C; Future  -     Vitamin D 25 Hydroxy; Future  -     T4, Free; Future  -     TSH; Future  3. Abnormal blood chemistry  -     Lipid Panel; Future  -     Hemoglobin A1C; Future  -     Vitamin D 25 Hydroxy; Future  -     T4, Free; Future  -     TSH; Future  4. Vitamin D deficiency  -     Vitamin D 25 Hydroxy; Future  5. Encounter for screening mammogram for malignant neoplasm of breast  -     BEKAH WYATT DIGITAL SCREEN BILATERAL; Future  6. Encounter for

## 2025-07-16 ENCOUNTER — OFFICE VISIT (OUTPATIENT)
Facility: CLINIC | Age: 50
End: 2025-07-16
Payer: COMMERCIAL

## 2025-07-16 VITALS
OXYGEN SATURATION: 100 % | DIASTOLIC BLOOD PRESSURE: 84 MMHG | SYSTOLIC BLOOD PRESSURE: 125 MMHG | RESPIRATION RATE: 16 BRPM | TEMPERATURE: 98 F | WEIGHT: 212.6 LBS | HEIGHT: 67 IN | BODY MASS INDEX: 33.37 KG/M2 | HEART RATE: 69 BPM

## 2025-07-16 DIAGNOSIS — E03.9 HYPOTHYROIDISM, UNSPECIFIED TYPE: ICD-10-CM

## 2025-07-16 DIAGNOSIS — Z12.31 ENCOUNTER FOR SCREENING MAMMOGRAM FOR MALIGNANT NEOPLASM OF BREAST: ICD-10-CM

## 2025-07-16 DIAGNOSIS — Z00.00 WELL ADULT EXAM: ICD-10-CM

## 2025-07-16 DIAGNOSIS — Z12.11 ENCOUNTER FOR SCREENING FOR COLORECTAL MALIGNANT NEOPLASM: ICD-10-CM

## 2025-07-16 DIAGNOSIS — Z12.12 ENCOUNTER FOR SCREENING FOR COLORECTAL MALIGNANT NEOPLASM: ICD-10-CM

## 2025-07-16 DIAGNOSIS — R79.9 ABNORMAL BLOOD CHEMISTRY: ICD-10-CM

## 2025-07-16 DIAGNOSIS — I82.431 ACUTE DEEP VEIN THROMBOSIS (DVT) OF POPLITEAL VEIN OF RIGHT LOWER EXTREMITY (HCC): ICD-10-CM

## 2025-07-16 DIAGNOSIS — F32.9 MAJOR DEPRESSIVE DISORDER, REMISSION STATUS UNSPECIFIED, UNSPECIFIED WHETHER RECURRENT: ICD-10-CM

## 2025-07-16 DIAGNOSIS — E55.9 VITAMIN D DEFICIENCY: ICD-10-CM

## 2025-07-16 DIAGNOSIS — Z76.89 ENCOUNTER TO ESTABLISH CARE: Primary | ICD-10-CM

## 2025-07-16 PROCEDURE — 99204 OFFICE O/P NEW MOD 45 MIN: CPT | Performed by: STUDENT IN AN ORGANIZED HEALTH CARE EDUCATION/TRAINING PROGRAM

## 2025-07-16 PROCEDURE — 99386 PREV VISIT NEW AGE 40-64: CPT | Performed by: STUDENT IN AN ORGANIZED HEALTH CARE EDUCATION/TRAINING PROGRAM

## 2025-07-16 RX ORDER — HYDROXYZINE HYDROCHLORIDE 10 MG/1
10 TABLET, FILM COATED ORAL NIGHTLY
Qty: 90 TABLET | Refills: 1 | Status: SHIPPED | OUTPATIENT
Start: 2025-07-16

## 2025-07-16 RX ORDER — APIXABAN 5 MG (74)
5 KIT ORAL SEE ADMIN INSTRUCTIONS
COMMUNITY

## 2025-07-16 RX ORDER — BUPROPION HYDROCHLORIDE 150 MG/1
150 TABLET ORAL EVERY MORNING
Qty: 90 TABLET | Refills: 1 | Status: SHIPPED | OUTPATIENT
Start: 2025-07-16

## 2025-07-16 RX ORDER — HYDROXYZINE HYDROCHLORIDE 10 MG/1
10 TABLET, FILM COATED ORAL NIGHTLY
COMMUNITY
Start: 2025-04-24 | End: 2025-07-16 | Stop reason: SDUPTHER

## 2025-07-16 SDOH — HEALTH STABILITY: PHYSICAL HEALTH: ON AVERAGE, HOW MANY DAYS PER WEEK DO YOU ENGAGE IN MODERATE TO STRENUOUS EXERCISE (LIKE A BRISK WALK)?: 2 DAYS

## 2025-07-16 SDOH — ECONOMIC STABILITY: FOOD INSECURITY: WITHIN THE PAST 12 MONTHS, THE FOOD YOU BOUGHT JUST DIDN'T LAST AND YOU DIDN'T HAVE MONEY TO GET MORE.: NEVER TRUE

## 2025-07-16 SDOH — HEALTH STABILITY: PHYSICAL HEALTH: ON AVERAGE, HOW MANY MINUTES DO YOU ENGAGE IN EXERCISE AT THIS LEVEL?: 30 MIN

## 2025-07-16 SDOH — ECONOMIC STABILITY: FOOD INSECURITY: WITHIN THE PAST 12 MONTHS, YOU WORRIED THAT YOUR FOOD WOULD RUN OUT BEFORE YOU GOT MONEY TO BUY MORE.: NEVER TRUE

## 2025-07-16 ASSESSMENT — PATIENT HEALTH QUESTIONNAIRE - PHQ9
SUM OF ALL RESPONSES TO PHQ QUESTIONS 1-9: 0
SUM OF ALL RESPONSES TO PHQ QUESTIONS 1-9: 0
4. FEELING TIRED OR HAVING LITTLE ENERGY: NOT AT ALL
6. FEELING BAD ABOUT YOURSELF - OR THAT YOU ARE A FAILURE OR HAVE LET YOURSELF OR YOUR FAMILY DOWN: NOT AT ALL
7. TROUBLE CONCENTRATING ON THINGS, SUCH AS READING THE NEWSPAPER OR WATCHING TELEVISION: NOT AT ALL
SUM OF ALL RESPONSES TO PHQ QUESTIONS 1-9: 0
1. LITTLE INTEREST OR PLEASURE IN DOING THINGS: NOT AT ALL
9. THOUGHTS THAT YOU WOULD BE BETTER OFF DEAD, OR OF HURTING YOURSELF: NOT AT ALL
2. FEELING DOWN, DEPRESSED OR HOPELESS: NOT AT ALL
SUM OF ALL RESPONSES TO PHQ QUESTIONS 1-9: 0
8. MOVING OR SPEAKING SO SLOWLY THAT OTHER PEOPLE COULD HAVE NOTICED. OR THE OPPOSITE, BEING SO FIGETY OR RESTLESS THAT YOU HAVE BEEN MOVING AROUND A LOT MORE THAN USUAL: NOT AT ALL
5. POOR APPETITE OR OVEREATING: NOT AT ALL
10. IF YOU CHECKED OFF ANY PROBLEMS, HOW DIFFICULT HAVE THESE PROBLEMS MADE IT FOR YOU TO DO YOUR WORK, TAKE CARE OF THINGS AT HOME, OR GET ALONG WITH OTHER PEOPLE: NOT DIFFICULT AT ALL
3. TROUBLE FALLING OR STAYING ASLEEP: NOT AT ALL

## 2025-07-16 ASSESSMENT — ENCOUNTER SYMPTOMS
SHORTNESS OF BREATH: 0
COUGH: 0

## 2025-07-16 NOTE — PROGRESS NOTES
Chief Complaint   Patient presents with    New Patient     Patient states that she wants to establish care.     Have you been to the ER, urgent care clinic since your last visit?  Hospitalized since your last visit?   YES - When: approximately 2  weeks ago.  Where and Why:  [facility] due to dehydration.  YES - When: approximately 2 weeks ago. Where and Why: [facility] Emergency Room blood clot due to dehydration.    Have you seen or consulted any other health care providers outside our system since your last visit?   YES - When: approximately 3  weeks ago.  Where and Why: [facility] for surgery.    Have you had a mammogram?”   NO    No breast cancer screening on file      “Have you had a pap smear?”    YES - Where: [facility] Nurse/CMA to request most recent records if not in the chart    Date of last Cervical Cancer screen (HPV or PAP): 2/9/2019       “Have you had a colorectal cancer screening such as a colonoscopy/FIT/Cologuard?    NO    No colonoscopy on file  No cologuard on file  No FIT/FOBT on file   No flexible sigmoidoscopy on file

## 2025-07-18 LAB
25(OH)D3 SERPL-MCNC: 30.8 NG/ML (ref 30–100)
CHOLEST SERPL-MCNC: 171 MG/DL
EST. AVERAGE GLUCOSE BLD GHB EST-MCNC: 108 MG/DL
HBA1C MFR BLD: 5.4 % (ref 4–5.6)
HDLC SERPL-MCNC: 90 MG/DL
HDLC SERPL: 1.9 (ref 0–5)
LDLC SERPL CALC-MCNC: 57 MG/DL (ref 0–100)
T4 FREE SERPL-MCNC: 1.4 NG/DL (ref 0.8–1.5)
TRIGL SERPL-MCNC: 120 MG/DL
TSH SERPL DL<=0.05 MIU/L-ACNC: 0.31 UIU/ML (ref 0.36–3.74)
VLDLC SERPL CALC-MCNC: 24 MG/DL

## 2025-07-29 LAB — NONINV COLON CA DNA+OCC BLD SCRN STL QL: NEGATIVE
